# Patient Record
Sex: FEMALE | Race: WHITE | Employment: FULL TIME | URBAN - METROPOLITAN AREA
[De-identification: names, ages, dates, MRNs, and addresses within clinical notes are randomized per-mention and may not be internally consistent; named-entity substitution may affect disease eponyms.]

---

## 2017-01-03 ENCOUNTER — OFFICE VISIT (OUTPATIENT)
Dept: PAIN MANAGEMENT | Age: 42
End: 2017-01-03

## 2017-01-03 VITALS — DIASTOLIC BLOOD PRESSURE: 67 MMHG | RESPIRATION RATE: 16 BRPM | SYSTOLIC BLOOD PRESSURE: 119 MMHG | HEART RATE: 83 BPM

## 2017-01-03 DIAGNOSIS — Z79.899 ENCOUNTER FOR LONG-TERM (CURRENT) USE OF MEDICATIONS: ICD-10-CM

## 2017-01-03 DIAGNOSIS — G89.4 CHRONIC PAIN SYNDROME: ICD-10-CM

## 2017-01-03 DIAGNOSIS — Z87.898 HISTORY OF HEADACHE: ICD-10-CM

## 2017-01-03 DIAGNOSIS — M79.7 FIBROMYALGIA: Primary | ICD-10-CM

## 2017-01-03 DIAGNOSIS — M79.18 MUSCULOSKELETAL PAIN: ICD-10-CM

## 2017-01-03 LAB
ALCOHOL UR POC: NORMAL
AMPHETAMINES UR POC: NEGATIVE
BARBITURATES UR POC: NEGATIVE
BENZODIAZEPINES UR POC: NEGATIVE
BUPRENORPHINE UR POC: NORMAL
CANNABINOIDS UR POC: NEGATIVE
CARISOPRODOL UR POC: NORMAL
COCAINE UR POC: NEGATIVE
FENTANYL UR POC: NORMAL
MDMA/ECSTASY UR POC: NEGATIVE
METHADONE UR POC: NEGATIVE
METHAMPHETAMINE UR POC: NEGATIVE
METHYLPHENIDATE UR POC: NEGATIVE
OPIATES UR POC: NEGATIVE
OXYCODONE UR POC: NEGATIVE
PHENCYCLIDINE UR POC: NEGATIVE
PROPOXYPHENE UR POC: NORMAL
TRAMADOL UR POC: NORMAL
TRICYCLICS UR POC: NEGATIVE

## 2017-01-03 RX ORDER — TRAZODONE HYDROCHLORIDE 50 MG/1
50 TABLET ORAL
Qty: 30 TAB | Refills: 2 | Status: SHIPPED | OUTPATIENT
Start: 2017-01-03 | End: 2017-03-30 | Stop reason: SDUPTHER

## 2017-01-03 RX ORDER — TRAMADOL HYDROCHLORIDE 50 MG/1
50-100 TABLET ORAL
Qty: 120 TAB | Refills: 2 | Status: SHIPPED | OUTPATIENT
Start: 2017-01-03 | End: 2017-03-30 | Stop reason: SDUPTHER

## 2017-01-03 RX ORDER — GABAPENTIN 800 MG/1
800 TABLET ORAL 4 TIMES DAILY
Qty: 120 TAB | Refills: 3 | Status: SHIPPED | OUTPATIENT
Start: 2017-01-03 | End: 2017-03-30 | Stop reason: SDUPTHER

## 2017-01-03 RX ORDER — ACETAMINOPHEN AND CODEINE PHOSPHATE 300; 30 MG/1; MG/1
TABLET ORAL
Qty: 20 TAB | Refills: 0 | Status: SHIPPED | OUTPATIENT
Start: 2017-01-03

## 2017-01-03 NOTE — PROGRESS NOTES
Nursing Notes    Patient presents to the office today in follow-up. Reviewed medications with counts as follows:    Rx Date filled Qty Dispensed Pill Count Last Dose Short   Tramadol 50 mg 12/5/16 120 23 This am. 2 doses no   Ms. Jag Sevilla has a reminder for a \"due or due soon\" health maintenance. I have asked that she contact her primary care provider for follow-up on this health maintenance. POC UDS was performed in office today    Any new labs or imaging since last appointment? NO    Have you been to an emergency room (ER) or urgent care clinic since your last visit? NO            Have you been hospitalized since your last visit? NO     If yes, where, when, and reason for visit? Have you seen or consulted any other health care providers outside of the [address] [place] [address]  since your last visit? NO     If yes, where, when, and reason for visit?

## 2017-01-03 NOTE — MR AVS SNAPSHOT
Visit Information Date & Time Provider Department Dept. Phone Encounter #  
 1/3/2017  9:15 AM Radha Mcgowan MD [facility] [phone] Follow-up Instructions Return in about 3 months (around 4/3/2017). Follow-up and Disposition History Upcoming Health Maintenance Date Due DTaP/Tdap/Td series (1 - Tdap) 8/29/1995 PAP AKA CERVICAL CYTOLOGY 8/29/1995 INFLUENZA AGE 9 TO ADULT 8/1/2016 Allergies as of 1/3/2017  Review Complete On: 1/3/2017 By: Radha Mcgowan MD  
 No Known Allergies Current Immunizations  Never Reviewed No immunizations on file. Not reviewed this visit You Were Diagnosed With   
  
 Codes Comments Fibromyalgia    -  Primary ICD-10-CM: M79.7 ICD-9-CM: 729.1 Encounter for long-term (current) use of medications     ICD-10-CM: Z79.899 ICD-9-CM: V58.69 Chronic pain syndrome     ICD-10-CM: G89.4 ICD-9-CM: 338.4 Musculoskeletal pain     ICD-10-CM: M79.1 ICD-9-CM: 729.1 History of headache     ICD-10-CM: Z87.898 ICD-9-CM: V13.89 Vitals BP Pulse Resp LMP OB Status Smoking Status 119/67 83 16 12/13/2016 Having regular periods Never Smoker Vitals History Preferred Pharmacy Pharmacy Name Phone [facility] [address] [phone] Your Updated Medication List  
  
   
This list is accurate as of: 1/3/17 10:34 AM.  Always use your most recent med list.  
  
  
  
  
 BENADRYL 25 mg capsule Generic drug:  diphenhydrAMINE Take 25 mg by mouth every six (6) hours as needed. gabapentin 800 mg tablet Commonly known as:  NEURONTIN Take 1 Tab by mouth four (4) times daily for 180 days. LYSTEDA 650 mg Tab tablet Generic drug:  tranexamic acid Take  by mouth. traMADol 50 mg tablet Commonly known as:  ULTRAM  
Take 1-2 Tabs by mouth two (2) times daily as needed for Pain (chronic) for up to 180 days. * traZODone 50 mg tablet Commonly known as:  Glenys Au Take 1 Tab by mouth nightly. * traZODone 50 mg tablet Commonly known as:  Glenys Au Take 1 Tab by mouth nightly for 30 days. * TYLENOL-CODEINE #3 300-30 mg per tablet Generic drug:  acetaminophen-codeine Take 1 Tab by mouth two (2) times daily as needed. * acetaminophen-codeine 300-30 mg per tablet Commonly known as:  TYLENOL #3  
1  Every day  Prn  H/As VITAMIN D2 50,000 unit capsule Generic drug:  ergocalciferol Take 50,000 Units by mouth. * Notice: This list has 4 medication(s) that are the same as other medications prescribed for you. Read the directions carefully, and ask your doctor or other care provider to review them with you. Prescriptions Printed Refills  
 traMADol (ULTRAM) 50 mg tablet 2 Sig: Take 1-2 Tabs by mouth two (2) times daily as needed for Pain (chronic) for up to 180 days. Class: Print Route: Oral  
 acetaminophen-codeine (TYLENOL #3) 300-30 mg per tablet 0 Si  Every day  Prn  H/As Class: Print Prescriptions Sent to Pharmacy Refills  
 gabapentin (NEURONTIN) 800 mg tablet 3 Sig: Take 1 Tab by mouth four (4) times daily for 180 days. Class: Normal  
 Pharmacy: [facility] Ph #: [phone] Route: Oral  
 traZODone (DESYREL) 50 mg tablet 2 Sig: Take 1 Tab by mouth nightly for 30 days. Class: Normal  
 Pharmacy: [id number] Ph #: [phone] Route: Oral  
  
Follow-up Instructions Return in about 3 months (around 4/3/2017). Referral Information Referral ID Referred By Referred To  
  
 [id number] Lea, [address] [place] [address], PHD   
   [address] [address] [facility], [address] [place] [address] Phone: [phone] Fax: [phone] Visits Status Start Date End Date 1 New Request 1/3/17 1/3/18 If your referral has a status of pending review or denied, additional information will be sent to support the outcome of this decision. Introducing [facility] SERVICES! Awilda Waite introduces Peer5 patient portal. Now you can access parts of your medical record, email your doctor's office, and request medication refills online. 1. In your internet browser, go to [email or web address] 2. Click on the First Time User? Click Here link in the Sign In box. You will see the New Member Sign Up page. 3. Enter your Peer5 Access Code exactly as it appears below. You will not need to use this code after youve completed the sign-up process. If you do not sign up before the expiration date, you must request a new code. · Peer5 Access Code: [email or web address] Expires: 1/4/2017 10:19 AM 
 
4. Enter the last four digits of your Social Security Number (xxxx) and Date of Birth (mm/dd/yyyy) as indicated and click Submit. You will be taken to the next sign-up page. 5. Create a Peer5 ID. This will be your Peer5 login ID and cannot be changed, so think of one that is secure and easy to remember. 6. Create a Peer5 password. You can change your password at any time. 7. Enter your Password Reset Question and Answer. This can be used at a later time if you forget your password. 8. Enter your e-mail address. You will receive e-mail notification when new information is available in [address] 9. Click Sign Up. You can now view and download portions of your medical record. 10. Click the Download Summary menu link to download a portable copy of your medical information. If you have questions, please visit the Frequently Asked Questions section of the Peer5 website. Remember, Peer5 is NOT to be used for urgent needs. For medical emergencies, dial 911. Now available from your iPhone and Android! Please provide this summary of care documentation to your next provider. Your primary care clinician is listed as Tyson Barraza. If you have any questions after today's visit, please call [phone].

## 2017-01-03 NOTE — PROGRESS NOTES
HISTORY OF PRESENT ILLNESS  Slim Aaron is a 42 y.o. female. HPI Comments: Meds help with pain control and quality of life. No new side effects reported today. No new medical problems reported today. Visit survey reviewed, and will be scanned. Recent Average pain level (out of 10). --    4-5  Chief complaint, chronic pain, history of fibromyalgia  Using trazodone at night  Gabapentin 800 mg, tramadol 50 mg twice a day as needed  She does rarely use Tylenol No. 3 for rare headaches. Most recently these prescriptions have come from her primary care physician. She is wondering if we can take over those prescriptions. I have agreed. Rare use of Tylenol No. 3 for headaches. I have also requested a neurology consult with my associate Dr. Margaret Yi for any other input on her headaches. We also discussed today, however at times living with the chronic pain has a negative impact from an emotional standpoint. She would like to try cognitive behavioral therapy or other treatments available from a mental health clinic. She will be given a referral/consult for this today. Review of Systems   Constitutional: Negative. Negative for chills and fever. HENT: Negative. Negative for ear pain. Eyes: Negative. Respiratory: Negative. Negative for hemoptysis and wheezing. Cardiovascular: Negative. Gastrointestinal: Positive for constipation (controlled with diet ). Negative for nausea and vomiting. Genitourinary: Negative. Musculoskeletal: Positive for myalgias. Negative for back pain, falls, joint pain and neck pain. She denies significant changes. Skin: Negative. Negative for itching and rash. Neurological: Positive for tingling and sensory change. Psychiatric/Behavioral: Negative for depression, hallucinations, memory loss, substance abuse and suicidal ideas. The patient is nervous/anxious.          She is looking forward to participating in a program \"Changing life through integrating mind and body\". Physical Exam   Constitutional: She appears well-developed and well-nourished. She is cooperative. She does not have a sickly appearance. HENT:   Head: Normocephalic and atraumatic. Right Ear: External ear normal. No drainage. Left Ear: External ear normal. No drainage. Nose: Nose normal.   Eyes: Lids are normal. Right eye exhibits no discharge. Left eye exhibits no discharge. Right conjunctiva has no hemorrhage. Left conjunctiva has no hemorrhage. Neck: Neck supple. No tracheal deviation present. No thyroid mass present. Pulmonary/Chest: Effort normal. No respiratory distress. Neurological: She is alert. No cranial nerve deficit. Skin: Skin is intact. No rash noted. Psychiatric: Her speech is normal. Her affect is not angry. She does not express inappropriate judgment. Nursing note and vitals reviewed. ASSESSMENT and PLAN  Encounter Diagnoses   Name Primary?  Fibromyalgia Yes    Encounter for long-term (current) use of medications     Chronic pain syndrome     Musculoskeletal pain     History of headache    No signs of addiction or diversion. The primary Dxes. ,including pain are controlled. Pain/Pain control/Meds and Quality Of Life have been reviewed. Nonpharmacologic therapy and non-opioid pharmacologic therapy are always considered. If opioid therapy is prescribed, this is only if the expected benefits for both pain and function are anticipated to outweigh risks. Possible changes to treatment plan considered. Support/education given as needed. Today-medications are as listed. No significant changes to medications. Follow up -- 3 months.  --Urine test or oral swab today. Also, the prescription monitoring program was reviewed today. The majority of today's visit was spent counseling and coordinating care. Total visit time-40 minutes. -Dragon medical dictation software was used for portions of this report. Unintended errors may occur.

## 2017-02-10 ENCOUNTER — HOSPITAL ENCOUNTER (OUTPATIENT)
Dept: PHYSICAL THERAPY | Age: 42
Discharge: HOME | End: 2017-02-10
Payer: COMMERCIAL

## 2017-02-10 PROCEDURE — 97161 PT EVAL LOW COMPLEX 20 MIN: CPT

## 2017-02-10 PROCEDURE — 97112 NEUROMUSCULAR REEDUCATION: CPT

## 2017-02-10 NOTE — PROGRESS NOTES
Physical Therapy Evaluation - Vestibular  Patient is a 43 y/o female presenting with chief c/o chronic constant vertigo for 15 years. Pt had diagnostic work-ups and some other testing ordered by a neurologist 10 years ago, and the patient reported there may have been some involvement with the left inner ear. Patient has seen a chiropractor and massage therapist for fibromyalgia symptoms (pain, headaches). Pt reports unsteadiness, but denies any falls in recent years. Symptoms are worse if having flare-up of immune system (stress, GI problems ), and sometimes visual stimulus. Pt uses a shopping cart when in stores for increased security. Posture:  [x] WNL      [] Forward head    [] Protracted shoulders    [] Retracted shoulders  [] Kyphosis:  [] increased   [] decreased   [] Lordosis:   [] increased   [] decreased  Other:    C/S ROM: [x] WFL    [] Limited    Describe:    Strength: [x] WFL    [] Limited    Describe:    Optional Tests:  Sensation:  [x] Intact [] Diminished    Describe:    Proprioception: [x] Intact [] Diminished    Describe:    SEE POC for OBJECTIVE DETAILS    Oculomotor Tests: (Fixation Not Blocked)       Ocular ROM:   [x] WFL    [] Limited    Describe:       Spontaneous Nystag. [] Neg     [] Pos    [] Left    [] Right       Gaze Holding Nystag.  [] Neg     [] Pos    [] Left    [] Right        Smooth Pursuit  [x] Neg     [] Pos    [] Left    [] Right        Saccades   [] Neg     [] Pos    [] Left    [] Right        VOR - Slow Head Mvmt [] Neg     [x] Pos           VOR - Fast Head Mvmt [] Neg     [] Pos    [] Left    [] Right        Head Thrust  [] Neg     [] Pos    [] Left    [] Right        Static Visual Acuity [] Neg     [] Pos    [] Left    [] Right        Dynamic Visual Acuity [] Neg     [] Pos    [] Left    [] Right     Other Special Tests:       Vertebral Artery Testing [] Neg     [] Pos    [] Left    [] Right       Hallpike-Logan Maneuver [] Neg     [] Pos    [] Left    [] Right       Roll Test   [] Neg     [] Pos    [] Left    [] Right       Lal Balance Scale [] Neg     [] Pos    Score:       Dynamic Gait Index [] Neg     [] Pos    Score:       Functional Gait Assess. [] Neg     [] Pos    Score:    Balance Standard Testing (Eyes Open/Eyes Closed - EO/EC)       Romberg   [] WFL    [] Pos    Describe:           Stand on Foam  [] WFL    [] Pos    Describe:        Standing on Rail  [] WFL    [] Pos    Describe:        Sharpened Romberg [] WFL    [] Pos    Describe:        Single Leg Stand  [] WFL    [] Pos    Describe: Motion Sensitivity:  [] Neg     [] Pos    Describe:    Computerized Dynamic Posturography:        [] Not Tested    [] WFL    Score:     Other Tests:    Time In/Out: 1:10/1:50  Pain In/Out: 5/10, 5/10

## 2017-02-10 NOTE — PROGRESS NOTES
[facility] [address], [address] [place] [address]  Phone: [phone]   [phone]  PLAN OF CARE / [address] [place] [address] PHYSICAL THERAPY SERVICES  Patient Name: Tex Frausto : 1975   Medical   Diagnosis: Other abnormalities of gait and mobility [R26.89] Treatment Diagnosis: R26.89   Onset Date: Chronic > 15 years     Referral Source: Sherman Vivas MD [facility]): 2/10/2017   Prior Hospitalization: See medical history Provider #: [id number]   Prior Level of Function: Independent w/ ADL's   Comorbidities: Fibromyalgia   Medications: Verified on Patient Summary List   The Plan of Care and following information is based on the information from the initial evaluation.   ===========================================================================================  Assessment / key information:  Patient is a 41 y/o female presenting with chief c/o chronic constant vertigo for 15 years. Pt had diagnostic work-ups and some other testing ordered by a neurologist 10 years ago, and the patient reported there may have been some involvement with the left inner ear. Patient has seen a chiropractor and massage therapist for fibromyalgia symptoms (pain, headaches). Pt reports unsteadiness, but denies any falls in recent years. Symptoms are worse if having flare-up of immune system (stress, GI problems ), and sometimes visual stimulus. Pt uses a shopping cart when in stores for increased security. Patient presents with slightly widened-base of support with ambulation. Postural sway is normal with static standing, increased to mild sway with eyes closed and moderate sway on foam/uneven surface. Mild/mod unsteadiness with tandem walking and gait with quick direction change. Ocular AROM/smooth pursuit is [facility] but pt reports increased vertigo symptoms with faster eye movements.  Horizontal and vertical gaze stabilization testing is [facility] at slow speeds, but again this moderately increases symptoms after 30 seconds. The patient was instructed in a home exercise program to address the above findings/deficits. The patient should benefit from therapy services to progress toward functional goals and improve quality of life.  ===========================================================================================  History MEDIUM  Complexity : 1-2 comorbidities / personal factors will impact the outcome/ POC ; Examination MEDIUM Complexity : 3 Standardized tests and measures addressing body structure, function, activity limitation and / or participation in recreation ; Presentation LOW Complexity : Stable, uncomplicated ; Decision Making MEDIUM Complexity : FOTO score of 26-74; Complexity LOW   Problem List: impaired gait/ balance, decrease ADL/ functional abilitiies and decrease activity tolerance   Treatment Plan may include any combination of the following: Therapeutic activities, Neuromuscular re-education, Gait/balance training and Patient education  Patient / Family readiness to learn indicated by: asking questions, trying to perform skills and interest  Persons(s) to be included in education: patient (P)  Barriers to Learning/Limitations: None  Measures taken:    Patient Goal (s): Learn how to progress   Patient self reported health status: fair  Rehabilitation Potential: good   Short Term Goals: To be accomplished in  4  weeks:  Pt will be able to perform standing x 1 gaze stabilization exercises 2/ <5/10 dizziness, no loss of balance  Independent/compliant with long term HEP for balance and gaze stabilization  Able to balance with eyes closed on foam > 30 seconds to demonstrate improved balance/proprioception   Long Term Goals:  To be accomplished in  8  weeks:  Improve FOTO outcome score by 5-10% to indicate a significant improvement in ADL function  Advance to walking with x 1 card exercises and no loss of balance for improved dynamic balance/equilibrium  Frequency / Duration:   Patient to be seen  1  time per week for 8  weeks:  Patient / Caregiver education and instruction: activity modification and exercises  G-Codes (GP): CLINT[place]  Therapist Signature: Malathi Jaeger PT, OCS Date: 8/69/7869   Certification Period: NA Time: 1:20 PM   ===========================================================================================  I certify that the above Physical Therapy Services are being furnished while the patient is under my care. I agree with the treatment plan and certify that this therapy is necessary. Physician Signature:        Date:       Time:     Please sign and return to [facility] or you may fax the signed copy to [phone]. Thank you.

## 2017-02-17 ENCOUNTER — HOSPITAL ENCOUNTER (OUTPATIENT)
Dept: PHYSICAL THERAPY | Age: 42
Discharge: HOME | End: 2017-02-17
Payer: COMMERCIAL

## 2017-02-17 PROCEDURE — 97112 NEUROMUSCULAR REEDUCATION: CPT

## 2017-02-17 NOTE — PROGRESS NOTES
PT DAILY TREATMENT NOTE     Patient Name: Aaliyah Sam  Date:2017  : 1975  [x]  Patient  Verified  Payor: Josh Wong / Plan: Sean Maldonado RPN / Product Type: Commerical /    In time:1:03  Out time:1:27  Total Treatment Time (min): 24  Total Timed Codes (min): 24  1:1 Treatment Time (min):    Visit #: 2 of 8    Treatment Area: Other abnormalities of gait and mobility [R26.89]    SUBJECTIVE  Pain Level (0-10 scale): 5  Any medication changes, allergies to medications, adverse drug reactions, diagnosis change, or new procedure performed?: [x] No    [] Yes (see summary sheet for update)  Subjective functional status/changes:   [] No changes reported  Patient reports some mild unsteadiness later after her exercises, but she also discontinued using a medication she was taking at night for pain and she wasn't sure if that could be a factor. OBJECTIVE     24 min Neuromuscular Re-education:  []  See flow sheet :   Rationale: improve balance and increase proprioception  to improve the patients ability to stand, walk, and change body positions    Pain Level (0-10 scale) post treatment: 5    ASSESSMENT/Changes in Function: Pt repeated seated x1 card exercises today and did not need any cueing or modification to speed, head movement amplitude, etc. Pt was advised to continue the same exercise for 1 more week to acclimate. Patient will continue to benefit from skilled PT services to address imbalance/dizziness to attain remaining goals.      []  See Plan of Care  []  See progress note/recertification  []  See Discharge Summary         PLAN  []  Upgrade activities as tolerated     [x]  Continue plan of care  []  Update interventions per flow sheet       []  Discharge due to:_  []  Other:_      Malorie Pedroza, PT 2017  1:04 PM

## 2017-02-24 ENCOUNTER — HOSPITAL ENCOUNTER (OUTPATIENT)
Dept: PHYSICAL THERAPY | Age: 42
Discharge: HOME | End: 2017-02-24
Payer: COMMERCIAL

## 2017-02-24 PROCEDURE — 97112 NEUROMUSCULAR REEDUCATION: CPT

## 2017-02-24 NOTE — PROGRESS NOTES
PT DAILY TREATMENT NOTE     Patient Name: Claire Hull  Date:2017  : 1975  [x]  Patient  Verified  Payor: Orlando Mccollum / Plan: Tawanda RITCHIE / Product Type: Commerical /    In time:1:00  Out time:1:25  Total Treatment Time (min): 25  Total Timed Codes (min): 25  1:1 Treatment Time (min):    Visit #: 3 of 8    Treatment Area: Other abnormalities of gait and mobility [R26.89]    SUBJECTIVE  Pain Level (0-10 scale): 4-5  Any medication changes, allergies to medications, adverse drug reactions, diagnosis change, or new procedure performed?: [x] No    [] Yes (see summary sheet for update)  Subjective functional status/changes:   [] No changes reported  Patient reports improved response to eye exercises this week compared to the previous week, despite not being able to consistently perform 3x/day. OBJECTIVE     25 min Neuromuscular Re-education:  []  See flow sheet :   Rationale: improve balance and increase proprioception  to improve the patients ability to stand, walk, change body positions    Pain Level (0-10 scale) post treatment: 5    ASSESSMENT/Changes in Function: Pt advanced to standing x 1 card exercises today with mild and temporary symptoms. No postural/balance instability was noted, and HEP was updated accordingly. Patient will continue to benefit from skilled PT services to address imbalance/dizziness to attain remaining goals.      []  See Plan of Care  []  See progress note/recertification  []  See Discharge Summary         Progress towards goals / Updated goals:  GOAL: Pt will be able to perform standing x 1 gaze stabilization exercises 2/ <5/10 dizziness, no loss of balance- Met today    PLAN  []  Upgrade activities as tolerated     [x]  Continue plan of care  []  Update interventions per flow sheet       []  Discharge due to:_  []  Other:_      Marrion Jeans, PT 2017  1:02 PM

## 2017-03-03 ENCOUNTER — HOSPITAL ENCOUNTER (OUTPATIENT)
Dept: PHYSICAL THERAPY | Age: 42
Discharge: HOME | End: 2017-03-03
Payer: COMMERCIAL

## 2017-03-03 PROCEDURE — 97112 NEUROMUSCULAR REEDUCATION: CPT

## 2017-03-03 NOTE — PROGRESS NOTES
PT DAILY TREATMENT NOTE     Patient Name: Hemalatha Lobato  Date:3/3/2017  : 1975  [x]  Patient  Verified  Payor: Roddy Díaz / Plan: Tess Quevedo RPN / Product Type: Commerical /    In time:1:00  Out time:1:30  Total Treatment Time (min): 30  Total Timed Codes (min): 30  1:1 Treatment Time (min):    Visit #: 4 of 8    Treatment Area: Other abnormalities of gait and mobility [R26.89]    SUBJECTIVE  Pain Level (0-10 scale): 4  Any medication changes, allergies to medications, adverse drug reactions, diagnosis change, or new procedure performed?: [x] No    [] Yes (see summary sheet for update)  Subjective functional status/changes:   [] No changes reported  Pt reports her recovery period required after the eye exercises is getting shorter, so they're going better. OBJECTIVE     30 min Neuromuscular Re-education:  []  See flow sheet :   Rationale: improve balance and increase proprioception  to improve the patients ability to stand, walk, change head/body positions    Other Objective/Functional Measures:     Pain Level (0-10 scale) post treatment: 4    ASSESSMENT/Changes in Function: Pt advanced to addition of x2 card exercises (moving target). This was more challenging but pt was able to complete full 1 min intervals with mild temporary symptoms. Patient will continue to benefit from skilled PT services to address imbalance/dizziness to attain remaining goals.      []  See Plan of Care  []  See progress note/recertification  []  See Discharge Summary         Progress towards goals / Updated goals:  GOAL: Pt will be able to perform standing x 1 gaze stabilization exercises 2/ <5/10 dizziness, no loss of balance- Met    PLAN  []  Upgrade activities as tolerated     [x]  Continue plan of care  []  Update interventions per flow sheet       []  Discharge due to:_  []  Other:_      Ruth Austni, PT 3/3/2017  1:09 PM

## 2017-03-10 ENCOUNTER — HOSPITAL ENCOUNTER (OUTPATIENT)
Dept: PHYSICAL THERAPY | Age: 42
Discharge: HOME | End: 2017-03-10
Payer: COMMERCIAL

## 2017-03-10 PROCEDURE — 97112 NEUROMUSCULAR REEDUCATION: CPT

## 2017-03-10 NOTE — PROGRESS NOTES
[facility] AT [address], [address] [place] [address]  Phone: [phone]   [phone]  PROGRESS NOTE  Patient Name: Hemalatha Lobato : 1975   Treatment/Medical Diagnosis: Other abnormalities of gait and mobility [R26.89]   Referral Source: Tarik Tavarez MD     Date of Initial Visit: 2/10/17 Attended Visits: 5 Missed Visits: 0     SUMMARY OF TREATMENT  Gaze stabilization exercises, balance/proprioceptive training, HEP  CURRENT STATUS  Patient is responding well to vestibular rehab after completion of 5 of 8 prescribed visits to date, and reports 30-40% overall. She reports improved dizziness symptoms and decrease sensation of movement when looking at stationary objects. She also reports some improvements in general balance with more dynamic movements like her exercise class. Pt is progressing well with gaze stabilization exercises, and currently is able to perform standing x1 and x2 gaze stabilization exercises with minimal dizziness and no imbalance. · Short Term Goals: To be accomplished in 4 weeks:  Pt will be able to perform standing x 1 gaze stabilization exercises 2/ <5/10 dizziness, no loss of balance- Met  Independent/compliant with long term HEP for balance and gaze stabilization- Met and ongoing  Able to balance with eyes closed on foam > 30 seconds to demonstrate improved balance/proprioception- Met  · Long Term Goals: To be accomplished in 8 weeks:  Improve FOTO outcome score by 5-10% to indicate a significant improvement in ADL function- Not met, improved 2%  Advance to walking with x 1 card exercises and no loss of balance for improved dynamic balance/equilibrium- Not met    RECOMMENDATIONS  Recommend continuing therapy 1x/week for 3-5 weeks to progress toward functional long term goals. If you have any questions/comments please contact us directly at [phone].    Thank you for allowing us to assist in the care of your patient. Therapist Signature: Erlinda Reynolds PT, OCS Date: 3/10/2017     Time: 12:34 PM   NOTE TO PHYSICIAN:  PLEASE COMPLETE THE ORDERS BELOW AND FAX TO   [facility] at [facility] UNIT: [phone]. If you are unable to process this request in 24 hours please contact our office: [phone].    ___ I have read the above report and request that my patient continue as recommended.   ___ I have read the above report and request that my patient continue therapy with the following changes/special instructions:_________________________________________________________   ___ I have read the above report and request that my patient be discharged from therapy.      Physician Signature:        Date:       Time:

## 2017-03-10 NOTE — PROGRESS NOTES
PT DAILY TREATMENT NOTE     Patient Name: Tex Frausto  Date:3/10/2017  : 1975  [x]  Patient  Verified  Payor: Anabelle Nassar / Plan: Sobeida RITCHIE / Product Type: Commerical /    In time:1:04  Out time:1:32  Total Treatment Time (min): 28  Total Timed Codes (min): 28  1:1 Treatment Time (min):    Visit #: 5 of 8    Treatment Area: Other abnormalities of gait and mobility [R26.89]    SUBJECTIVE  Pain Level (0-10 scale): 4  Any medication changes, allergies to medications, adverse drug reactions, diagnosis change, or new procedure performed?: [x] No    [] Yes (see summary sheet for update)  Subjective functional status/changes:   [] No changes reported  See Progress Note    OBJECTIVE     28 min Neuromuscular Re-education:  []  See flow sheet :   Rationale: improve balance and increase proprioception  to improve the patients ability to change body positions, walk    Pain Level (0-10 scale) post treatment: 4    ASSESSMENT/Changes in Function:   Patient will continue to benefit from skilled PT services to address imbalance/dizziness to attain remaining goals.      []  See Plan of Care  [x]  See progress note/recertification  []  See Discharge Summary         PLAN  []  Upgrade activities as tolerated     [x]  Continue plan of care  []  Update interventions per flow sheet       []  Discharge due to:_  []  Other:_      Travis Medley, PT 3/10/2017  12:35 PM

## 2017-03-13 ENCOUNTER — OFFICE VISIT (OUTPATIENT)
Dept: PAIN MANAGEMENT | Age: 42
End: 2017-03-13

## 2017-03-13 VITALS
DIASTOLIC BLOOD PRESSURE: 85 MMHG | HEART RATE: 76 BPM | BODY MASS INDEX: 31.34 KG/M2 | WEIGHT: 195 LBS | HEIGHT: 66 IN | SYSTOLIC BLOOD PRESSURE: 117 MMHG

## 2017-03-13 DIAGNOSIS — M25.50 POLYARTHRALGIA: ICD-10-CM

## 2017-03-13 DIAGNOSIS — M79.10 MYALGIA: ICD-10-CM

## 2017-03-13 DIAGNOSIS — G43.001 MIGRAINE WITHOUT AURA AND WITH STATUS MIGRAINOSUS, NOT INTRACTABLE: ICD-10-CM

## 2017-03-13 DIAGNOSIS — H83.2X9 VESTIBULAR DYSFUNCTION, UNSPECIFIED LATERALITY: ICD-10-CM

## 2017-03-13 DIAGNOSIS — M79.7 FIBROMYALGIA: ICD-10-CM

## 2017-03-13 DIAGNOSIS — M79.7 FIBROMYALGIA: Primary | ICD-10-CM

## 2017-03-13 NOTE — PROGRESS NOTES
HISTORY OF PRESENT ILLNESS  Rosemarie Zelaya is a 42 y.o. female. HPI she is seen in algology assessment at the [facility]. She is referred for evaluation and treatment of widespread pain which is both musculoskeletal and polyarticular. Extensive external medical records pertaining to her prior treatment were reviewed. Her pain complaints are long-standing. She reports that she was diagnosed with fibromyalgia approximately 24 years ago by a rheumatologist who did extensive blood testing at the time. Initially, she was felt to have rheumatoid arthritis but, upon reassessment, was felt to be old due to fibromyalgia. She was never tested for Lyme disease. Approximately 14 years ago, she developed symptoms consistent with hemisensory deficits difficulty walking and profound fatigue. She was evaluated for possible multiple sclerosis including an MRI of the brain and lumbar puncture which were unremarkable. More recently she has been plagued by low level vertigo. Workup has been unremarkable although she does not recall recent MRI studies of the brain and cerebellopontine angle. She has had no recent laboratory testing to assess for underlying rheumatologic illness. She has had electrodiagnostic testing performed in the past which was unremarkable. She also has a history of episodic migraine headaches which have been well controlled with the use of tramadol and/or Tylenol 3. She reports getting at most 1-2 headaches per month. Review of Systems   Constitutional: Negative. Negative for chills and fever. HENT: Negative. Negative for ear pain. Eyes: Negative. Respiratory: Negative. Negative for hemoptysis and wheezing. Cardiovascular: Negative. Gastrointestinal: Positive for constipation (controlled with diet ). Negative for nausea and vomiting. Genitourinary: Negative. Musculoskeletal: Positive for myalgias.  Negative for back pain, falls, joint pain and neck pain.        She denies significant changes. Skin: Negative. Negative for itching and rash. Neurological: Positive for tingling and sensory change. Psychiatric/Behavioral: Negative for depression, hallucinations, memory loss, substance abuse and suicidal ideas. The patient is nervous/anxious. She is looking forward to participating in a program \"Changing life through integrating mind and body\". All other systems reviewed and are negative. Physical Exam   Constitutional: She appears well-developed and well-nourished. She is cooperative. She does not have a sickly appearance. No distress. HENT:   Head: Normocephalic and atraumatic. Right Ear: External ear normal. No drainage. Left Ear: External ear normal. No drainage. Nose: Nose normal.   Eyes: Lids are normal. Right eye exhibits no discharge. Left eye exhibits no discharge. Right conjunctiva has no hemorrhage. Left conjunctiva has no hemorrhage. Neck: Neck supple. No tracheal deviation present. No thyroid mass and no thyromegaly present. Pulmonary/Chest: Effort normal. No respiratory distress. Neurological: She is alert. No cranial nerve deficit. Skin: Skin is intact. No rash noted. Psychiatric: Her speech is normal. Her affect is not angry. She does not express inappropriate judgment. Nursing note and vitals reviewed. Her fibromyalgia score of 25 is consistent with the current ACR criteria for the diagnosis of fibromyalgia. In addition she does demonstrate 14 out of 18 tender points positive. ASSESSMENT and PLAN  Encounter Diagnoses   Name Primary?  Fibromyalgia Yes    Migraine without aura and with status migrainosus, not intractable     Polyarthralgia     Myalgia     Vestibular dysfunction, unspecified laterality      An MRI of the brain with and without contrast with particular attention to the cerebellopontine angle will be scheduled.   Laboratory testing with respect to underlying rheumatologic illness will also be undertaken. Further recommendations will be based upon the results of the above testing.     Counseling occupied > 50% of visit:  Total time: 50 minutes

## 2017-03-13 NOTE — PROGRESS NOTES
Nursing Notes    Patient presents to the office today for a consult. Patient rates her pain at 1/10 on the numerical pain scale. POC UDS was not performed in office today    Any new labs or imaging since last appointment? NO    Have you been to an emergency room (ER) or urgent care clinic since your last visit? NO            Have you been hospitalized since your last visit? NO     If yes, where, when, and reason for visit? Have you seen or consulted any other health care providers outside of the [address] [place] [address]  since your last visit? NO     If yes, where, when, and reason for visit? HM deferred to pcp.

## 2017-03-17 ENCOUNTER — HOSPITAL ENCOUNTER (OUTPATIENT)
Dept: PHYSICAL THERAPY | Age: 42
Discharge: HOME | End: 2017-03-17
Payer: COMMERCIAL

## 2017-03-17 ENCOUNTER — DOCUMENTATION ONLY (OUTPATIENT)
Dept: PAIN MANAGEMENT | Age: 42
End: 2017-03-17

## 2017-03-17 PROCEDURE — 97112 NEUROMUSCULAR REEDUCATION: CPT

## 2017-03-17 NOTE — PROGRESS NOTES
PT DAILY TREATMENT NOTE     Patient Name: Cas Gray  Date:3/17/2017  : 1975  [x]  Patient  Verified  Payor: Francisco Rather / Plan: Vani Reyes RPN / Product Type: Commerical /    In time:1:05  Out time:1:31  Total Treatment Time (min): 26  Total Timed Codes (min): 26  1:1 Treatment Time (min):    Visit #: 6 of 8-10    Treatment Area: Other abnormalities of gait and mobility [R26.89]    SUBJECTIVE  Pain Level (0-10 scale): 4-5  Any medication changes, allergies to medications, adverse drug reactions, diagnosis change, or new procedure performed?: [x] No    [] Yes (see summary sheet for update)  Subjective functional status/changes:   [] No changes reported  The eye exercise where the card is still is getting really easy for me. OBJECTIVE    26 min Neuromuscular Re-education:  []  See flow sheet :   Rationale: improve balance and increase proprioception  to improve the patients ability to stand, walk, change body positions    Pain Level (0-10 scale) post treatment: 4    ASSESSMENT/Changes in Function: Advanced to x1 card exercises walking today. Pt remained steady with her ambulation, and denied any increase in dizziness or unsteadiness during/after this activity. HEP updated accordingly. Patient will continue to benefit from skilled PT services to address imbalance/dizziness to attain remaining goals.      []  See Plan of Care  []  See progress note/recertification  []  See Discharge Summary         *    PLAN  []  Upgrade activities as tolerated     [x]  Continue plan of care  []  Update interventions per flow sheet       []  Discharge due to:_  []  Other:_      Raman Acevedo PT 3/17/2017  8:24 AM

## 2017-03-17 NOTE — PROGRESS NOTES
Paperwork for MRI faxed [facility] . They will schedule directly with pt.

## 2017-03-24 ENCOUNTER — HOSPITAL ENCOUNTER (OUTPATIENT)
Dept: PHYSICAL THERAPY | Age: 42
Discharge: HOME | End: 2017-03-24
Payer: COMMERCIAL

## 2017-03-24 PROCEDURE — 97112 NEUROMUSCULAR REEDUCATION: CPT

## 2017-03-24 NOTE — PROGRESS NOTES
PT DAILY TREATMENT NOTE     Patient Name: Leslie Fowler  Date:3/24/2017  : 1975  [x]  Patient  Verified  Payor: Mikki Cueva / Plan: Elsa Morin RPN / Product Type: Commerical /    In time:1:00  Out time:1:30  Total Treatment Time (min): 30  Total Timed Codes (min): 30  1:1 Treatment Time (min):    Visit #: 7 of 8-10    Treatment Area: Other abnormalities of gait and mobility [R26.89]    SUBJECTIVE  Pain Level (0-10 scale): 4-5  Any medication changes, allergies to medications, adverse drug reactions, diagnosis change, or new procedure performed?: [x] No    [] Yes (see summary sheet for update)  Subjective functional status/changes:   [] No changes reported  I've been under the weather this week (not related to my dizziness) but it's caused me to be in bed. OBJECTIVE     30 min Neuromuscular Re-education:  []  See flow sheet :   Rationale: improve balance and increase proprioception  to improve the patients ability to walk, change head/body positions    Pain Level (0-10 scale) post treatment: 4-5    ASSESSMENT/Changes in Function: Added directional changing drills with walking today. No unsteadiness noted with this activity. Pt needs to increase HEP compliance specifically with new walking card exercises for best improvement. Patient will continue to benefit from skilled PT services to address imbalance/dizziness to attain remaining goals.      []  See Plan of Care  []  See progress note/recertification  []  See Discharge Summary    PLAN  []  Upgrade activities as tolerated     [x]  Continue plan of care  []  Update interventions per flow sheet       []  Discharge due to:_  []  Other:_      Shelley Singleton, PT 3/24/2017  1:03 PM

## 2017-03-30 ENCOUNTER — OFFICE VISIT (OUTPATIENT)
Dept: PAIN MANAGEMENT | Age: 42
End: 2017-03-30

## 2017-03-30 VITALS — DIASTOLIC BLOOD PRESSURE: 69 MMHG | HEART RATE: 82 BPM | SYSTOLIC BLOOD PRESSURE: 113 MMHG | RESPIRATION RATE: 18 BRPM

## 2017-03-30 DIAGNOSIS — G89.4 CHRONIC PAIN SYNDROME: ICD-10-CM

## 2017-03-30 DIAGNOSIS — Z87.898 HISTORY OF HEADACHE: ICD-10-CM

## 2017-03-30 DIAGNOSIS — M79.18 MUSCULOSKELETAL PAIN: ICD-10-CM

## 2017-03-30 DIAGNOSIS — M79.7 FIBROMYALGIA: Primary | ICD-10-CM

## 2017-03-30 RX ORDER — TRAZODONE HYDROCHLORIDE 50 MG/1
50 TABLET ORAL
Qty: 30 TAB | Refills: 2 | Status: SHIPPED | OUTPATIENT
Start: 2017-03-30 | End: 2017-06-28 | Stop reason: SDUPTHER

## 2017-03-30 RX ORDER — GABAPENTIN 800 MG/1
800 TABLET ORAL 4 TIMES DAILY
Qty: 120 TAB | Refills: 3 | Status: SHIPPED | OUTPATIENT
Start: 2017-03-30 | End: 2017-06-28 | Stop reason: SDUPTHER

## 2017-03-30 RX ORDER — TRAMADOL HYDROCHLORIDE 50 MG/1
50-100 TABLET ORAL
Qty: 120 TAB | Refills: 2 | Status: SHIPPED | OUTPATIENT
Start: 2017-03-30 | End: 2017-06-28 | Stop reason: SDUPTHER

## 2017-03-30 NOTE — PROGRESS NOTES
Nursing Notes    Patient presents to the office today in follow-up. Reviewed medications with counts as follows:    Rx Date filled Qty Dispensed Pill Count Last Dose Short   Tramadol 50 mg 3/6/17 120 39 This am no   Ms. Jag Sevilla has a reminder for a \"due or due soon\" health maintenance. I have asked that she contact her primary care provider for follow-up on this health maintenance. POC UDS was not performed in office today    Any new labs or imaging since last appointment? NO    Have you been to an emergency room (ER) or urgent care clinic since your last visit? NO            Have you been hospitalized since your last visit? NO     If yes, where, when, and reason for visit? Have you seen or consulted any other health care providers outside of the [address] [place] [address]  since your last visit? NO     If yes, where, when, and reason for visit?    chiropractor

## 2017-03-30 NOTE — PROGRESS NOTES
HISTORY OF PRESENT ILLNESS  Cas Gray is a 42 y.o. female. HPI Comments: Meds help with pain control and quality of life. No new side effects reported today. Visit survey reviewed and will be scanned.  reviewed. Recent average level of pain(out of 10)-  4-5  Chief complaint pain all over  History fibromyalgia  Using tramadol 50 mg, 1 or 2, twice a day as needed  Trazodone 50 mg in the evening to help with sleep  Gabapentin 800 mg 4 times a day  Medication helps improve general activity, mood, walking, sleep, enjoyment of life      Measuring clinical outcomes of chronic pain patients. This was reviewed today. The survey will be scanned. Please see the survey for details. Total score-5      Review of Systems   Constitutional: Negative. Negative for chills and fever. HENT: Negative. Negative for ear pain. Eyes: Negative. Respiratory: Negative. Negative for hemoptysis and wheezing. Cardiovascular: Negative. Gastrointestinal: Positive for constipation (controlled with diet ). Negative for nausea and vomiting. Genitourinary: Negative. Musculoskeletal: Positive for myalgias. Negative for back pain, falls, joint pain and neck pain. She denies significant changes. Skin: Negative. Negative for itching and rash. Neurological: Positive for tingling and sensory change. Psychiatric/Behavioral: Negative for depression, hallucinations, memory loss, substance abuse and suicidal ideas. The patient is nervous/anxious. She is looking forward to participating in a program \"Changing life through integrating mind and body\". Physical Exam   Constitutional: She appears well-developed and well-nourished. She is cooperative. She does not have a sickly appearance. HENT:   Head: Normocephalic and atraumatic. Right Ear: External ear normal. No drainage. Left Ear: External ear normal. No drainage.    Nose: Nose normal.   Eyes: Lids are normal. Right eye exhibits no discharge. Left eye exhibits no discharge. Right conjunctiva has no hemorrhage. Left conjunctiva has no hemorrhage. Neck: Neck supple. No tracheal deviation present. No thyroid mass present. Pulmonary/Chest: Effort normal. No respiratory distress. Neurological: She is alert. No cranial nerve deficit. Skin: Skin is intact. No rash noted. Psychiatric: Her speech is normal. Her affect is not angry. She does not express inappropriate judgment. Nursing note and vitals reviewed. ASSESSMENT and PLAN  Encounter Diagnoses   Name Primary?  Fibromyalgia Yes    Chronic pain syndrome     Musculoskeletal pain     History of headache    No indicators for medication misuse.  reviewed. Pain Meds and Quality Of Life have been reviewed. Nonpharmacologic therapy and non-opioid pharmacologic therapy were considered. If opioid therapy is prescribed, this is only if the expected benefits are anticipated to outweigh risks. Possible changes to treatment plan considered. Support/education given as needed. Today-medications are as listed. No significant changes to medications. Follow up -- 3 months.

## 2017-03-30 NOTE — MR AVS SNAPSHOT
Visit Information Date & Time Provider Department Dept. Phone Encounter #  
 3/30/2017  9:15 AM Axel Bhatti MD [facility] [phone] [id number] Follow-up Instructions Return in about 3 months (around 6/30/2017). Follow-up and Disposition History Upcoming Health Maintenance Date Due DTaP/Tdap/Td series (1 - Tdap) 8/29/1995 PAP AKA CERVICAL CYTOLOGY 8/29/1995 INFLUENZA AGE 9 TO ADULT 8/1/2016 Allergies as of 3/30/2017  Review Complete On: 3/30/2017 By: Axel Bhatti MD  
 No Known Allergies Current Immunizations  Never Reviewed No immunizations on file. Not reviewed this visit You Were Diagnosed With   
  
 Codes Comments Fibromyalgia    -  Primary ICD-10-CM: M79.7 ICD-9-CM: 729.1 Chronic pain syndrome     ICD-10-CM: G89.4 ICD-9-CM: 338.4 Musculoskeletal pain     ICD-10-CM: M79.1 ICD-9-CM: 729.1 History of headache     ICD-10-CM: Z87.898 ICD-9-CM: V13.89 Vitals BP Pulse Resp LMP OB Status Smoking Status 113/69 82 18 03/09/2017 Having regular periods Never Smoker Preferred Pharmacy Pharmacy Name Phone [facility], [address] [place] [address] [phone] Your Updated Medication List  
  
   
This list is accurate as of: 3/30/17 10:17 AM.  Always use your most recent med list.  
  
  
  
  
 BENADRYL 25 mg capsule Generic drug:  diphenhydrAMINE Take 25 mg by mouth every six (6) hours as needed. gabapentin 800 mg tablet Commonly known as:  NEURONTIN Take 1 Tab by mouth four (4) times daily for 180 days. LYSTEDA 650 mg Tab tablet Generic drug:  tranexamic acid Take  by mouth. traMADol 50 mg tablet Commonly known as:  ULTRAM  
Take 1-2 Tabs by mouth two (2) times daily as needed for Pain (chronic) for up to 180 days. * traZODone 50 mg tablet Commonly known as:  Oletta Priscilla Take 1 Tab by mouth nightly. * traZODone 50 mg tablet Commonly known as:  Tate Shin Take 1 Tab by mouth nightly for 30 days. * TYLENOL-CODEINE #3 300-30 mg per tablet Generic drug:  acetaminophen-codeine Take 1 Tab by mouth two (2) times daily as needed. * acetaminophen-codeine 300-30 mg per tablet Commonly known as:  TYLENOL #3  
1  Every day  Prn  H/As VITAMIN D2 50,000 unit capsule Generic drug:  ergocalciferol Take 50,000 Units by mouth. * Notice: This list has 4 medication(s) that are the same as other medications prescribed for you. Read the directions carefully, and ask your doctor or other care provider to review them with you. Prescriptions Printed Refills  
 traMADol (ULTRAM) 50 mg tablet 2 Sig: Take 1-2 Tabs by mouth two (2) times daily as needed for Pain (chronic) for up to 180 days. Class: Print Route: Oral  
  
Prescriptions Sent to Pharmacy Refills  
 gabapentin (NEURONTIN) 800 mg tablet 3 Sig: Take 1 Tab by mouth four (4) times daily for 180 days. Class: Normal  
 Pharmacy: [address] Ph #: [phone] Route: Oral  
 traZODone (DESYREL) 50 mg tablet 2 Sig: Take 1 Tab by mouth nightly for 30 days. Class: Normal  
 Pharmacy: [email or web address] Ph #: [phone] Route: Oral  
  
Follow-up Instructions Return in about 3 months (around 6/30/2017). To-Do List   
 03/31/2017 1:00 PM  
  Appointment with Jannet Gordon at [address] [place] [address] ([phone]) Introducing [facility]! [employer] introduces [employer] patient portal. Now you can access parts of your medical record, email your doctor's office, and request medication refills online. 1. In your internet browser, go to [email or web address] 2. Click on the First Time User? Click Here link in the Sign In box. You will see the New Member Sign Up page. 3. Enter your [employer] Access Code exactly as it appears below. You will not need to use this code after youve completed the sign-up process. If you do not sign up before the expiration date, you must request a new code. · [employer] Access Code: [email or web address] Expires: 5/8/2017 10:42 AM 
 
4. Enter the last four digits of your Social Security Number (xxxx) and Date of Birth (mm/dd/yyyy) as indicated and click Submit. You will be taken to the next sign-up page. 5. Create a [employer] ID. This will be your [employer] login ID and cannot be changed, so think of one that is secure and easy to remember. 6. Create a [employer] password. You can change your password at any time. 7. Enter your Password Reset Question and Answer. This can be used at a later time if you forget your password. 8. Enter your e-mail address. You will receive e-mail notification when new information is available in [address]. 9. Click Sign Up. You can now view and download portions of your medical record. 10. Click the Download Summary menu link to download a portable copy of your medical information. If you have questions, please visit the Frequently Asked Questions section of the [employer] website. Remember, [employer] is NOT to be used for urgent needs. For medical emergencies, dial 911. Now available from your iPhone and Android! Please provide this summary of care documentation to your next provider. Your primary care clinician is listed as Ang Snadhu. If you have any questions after today's visit, please call [phone].

## 2017-03-31 ENCOUNTER — HOSPITAL ENCOUNTER (OUTPATIENT)
Dept: PHYSICAL THERAPY | Age: 42
Discharge: HOME | End: 2017-03-31
Payer: COMMERCIAL

## 2017-03-31 PROCEDURE — 97112 NEUROMUSCULAR REEDUCATION: CPT

## 2017-03-31 NOTE — PROGRESS NOTES
PT DAILY TREATMENT NOTE 8    Patient Name: Priscilla Seip  Date:3/31/2017  : 1975  [x]  Patient  Verified  Payor: Angelo Shetty / Plan: Jessica Mclean RPN / Product Type: Commerical /    In time:1:05  Out time:1:35  Total Treatment Time (min): 30  Total Timed Codes (min): 30  1:1 Treatment Time (min):    Visit #: 8 of 10    Treatment Area: Other abnormalities of gait and mobility [R26.89]    SUBJECTIVE  Pain Level (0-10 scale): 5- HA  Any medication changes, allergies to medications, adverse drug reactions, diagnosis change, or new procedure performed?: [x] No    [] Yes (see summary sheet for update)  Subjective functional status/changes:   [] No changes reported  I have a sinus headache today and I'm not sure how good my balance will be. OBJECTIVE    30 min Neuromuscular Re-education:  []  See flow sheet :   Rationale: improve balance and increase proprioception  to improve the patients ability to walk, change head/body positions    Pain Level (0-10 scale) post treatment: 5 HA    ASSESSMENT/Changes in Function:   GOAL:Advance to walking with x 1 card exercises and no loss of balance for improved dynamic balance/equilibrium- Met    Patient will continue to benefit from skilled PT services to address imbalance/dizziness to attain remaining goals.      []  See Plan of Care  []  See progress note/recertification  []  See Discharge Summary           PLAN  []  Upgrade activities as tolerated     [x]  Continue plan of care  []  Update interventions per flow sheet       []  Discharge due to:_  [x]  Other:_  Follow up in 2 weeks, pt will be out of town next week    Vida Collier PT 3/31/2017  3:48 PM

## 2017-04-14 ENCOUNTER — APPOINTMENT (OUTPATIENT)
Dept: PHYSICAL THERAPY | Age: 42
End: 2017-04-14
Payer: COMMERCIAL

## 2017-04-21 ENCOUNTER — HOSPITAL ENCOUNTER (OUTPATIENT)
Dept: PHYSICAL THERAPY | Age: 42
Discharge: HOME | End: 2017-04-21
Payer: COMMERCIAL

## 2017-04-21 PROCEDURE — 97112 NEUROMUSCULAR REEDUCATION: CPT

## 2017-04-21 NOTE — PROGRESS NOTES
[facility] AT [address], [address] [place] [address]  Phone: [phone]   [phone]  DISCHARGE SUMMARY  Patient Name: Sharri Dubin : 1975   Treatment/Medical Diagnosis: Other abnormalities of gait and mobility [R26.89]   Referral Source: Iván Gordon MD     Date of Initial Visit: 2/10/17 Attended Visits: 9 Missed Visits: 1     SUMMARY OF TREATMENT  Gaze stabilization exercises, balance/proprioceptive training, HEP  CURRENT STATUS  Patient has shown excellent progress after 9 therapy sessions for vestibular dysfunction/dizziness. Pt reports significant reduction in dizziness with focusing on objects, and is able to perform high level balance activities and participate in exercise class-based workouts. Pt has also progressed through all vestibular gaze stabilization exercises with walking, and agrees she is ready for discharge to continue her HEP independently. · Short Term Goals: To be accomplished in 4 weeks:  Pt will be able to perform standing x 1 gaze stabilization exercises 2/ <5/10 dizziness, no loss of balance- Met  Independent/compliant with long term HEP for balance and gaze stabilization- Met and ongoing  Able to balance with eyes closed on foam > 30 seconds to demonstrate improved balance/proprioception- Met  · Long Term Goals: To be accomplished in 8 weeks:  Improve FOTO outcome score by 5-10% to indicate a significant improvement in ADL function- Met, improved from 73/100 to 93/100  Advance to walking with x 1 card exercises and no loss of balance for improved dynamic balance/equilibrium- Met    RECOMMENDATIONS  Discontinue therapy. Progressing towards or have reached established goals. If you have any questions/comments please contact us directly at [phone]. Thank you for allowing us to assist in the care of your patient.     Therapist Signature: rPavin Wang PT, OCS Date: 17     Time: 4:23 PM

## 2017-04-21 NOTE — PROGRESS NOTES
PT DAILY TREATMENT NOTE     Patient Name: Chuck Freeze  Date:2017  : 1975  [x]  Patient  Verified  Payor: Kun Fox / Plan: Raheem Alford RPN / Product Type: Commerical /    In time:1:04  Out time:1:36  Total Treatment Time (min): 32  Total Timed Codes (min): 32  1:1 Treatment Time (min):    Visit #: 9 of 10    Treatment Area: Other abnormalities of gait and mobility [R26.89]    SUBJECTIVE  Pain Level (0-10 scale): 3  Any medication changes, allergies to medications, adverse drug reactions, diagnosis change, or new procedure performed?: [x] No    [] Yes (see summary sheet for update)  Subjective functional status/changes:   [] No changes reported  See Progress Note    OBJECTIVE     32 min Neuromuscular Re-education:  []  See flow sheet :   Rationale: improve balance and increase proprioception  to improve the patients ability to stand, walk and change body positions    Pain Level (0-10 scale) post treatment: 3    ASSESSMENT/Changes in Function:   Patient will continue to benefit from skilled PT services to address imbalance/dizziness to attain remaining goals.      []  See Plan of Care  []  See progress note/recertification  [x]  See Discharge Summary         PLAN  []  Upgrade activities as tolerated     [x]  Continue plan of care  []  Update interventions per flow sheet       []  Discharge due to:_  []  Other:_      Jennyfer Steinberg, PT 2017  1:05 PM

## 2017-06-01 RX ORDER — TRAZODONE HYDROCHLORIDE 50 MG/1
TABLET ORAL
Qty: 30 TAB | Refills: 2 | Status: SHIPPED | OUTPATIENT
Start: 2017-06-01 | End: 2017-12-12 | Stop reason: SDUPTHER

## 2017-06-28 ENCOUNTER — OFFICE VISIT (OUTPATIENT)
Dept: PAIN MANAGEMENT | Age: 42
End: 2017-06-28

## 2017-06-28 VITALS
DIASTOLIC BLOOD PRESSURE: 62 MMHG | SYSTOLIC BLOOD PRESSURE: 113 MMHG | WEIGHT: 195 LBS | HEART RATE: 76 BPM | BODY MASS INDEX: 31.34 KG/M2 | HEIGHT: 66 IN

## 2017-06-28 DIAGNOSIS — G89.4 CHRONIC PAIN SYNDROME: Primary | ICD-10-CM

## 2017-06-28 DIAGNOSIS — Z87.898 HISTORY OF HEADACHE: ICD-10-CM

## 2017-06-28 DIAGNOSIS — M79.7 FIBROMYALGIA: ICD-10-CM

## 2017-06-28 DIAGNOSIS — M79.18 MUSCULOSKELETAL PAIN: ICD-10-CM

## 2017-06-28 RX ORDER — GABAPENTIN 800 MG/1
800 TABLET ORAL 4 TIMES DAILY
Qty: 120 TAB | Refills: 3 | Status: SHIPPED | OUTPATIENT
Start: 2017-06-28 | End: 2017-09-27 | Stop reason: SDUPTHER

## 2017-06-28 RX ORDER — TRAZODONE HYDROCHLORIDE 50 MG/1
50 TABLET ORAL
Qty: 30 TAB | Refills: 2 | Status: SHIPPED | OUTPATIENT
Start: 2017-06-28 | End: 2017-07-28

## 2017-06-28 RX ORDER — TRAMADOL HYDROCHLORIDE 50 MG/1
50-100 TABLET ORAL
Qty: 120 TAB | Refills: 2 | Status: SHIPPED | OUTPATIENT
Start: 2017-06-28 | End: 2017-09-27 | Stop reason: SDUPTHER

## 2017-06-28 NOTE — MR AVS SNAPSHOT
Visit Information Date & Time Provider Department Dept. Phone Encounter #  
 6/28/2017  9:00 AM Manuel Perez MD [facility] [phone] [id number] Follow-up Instructions Return in about 2 months (around 8/28/2017). Follow-up and Disposition History Upcoming Health Maintenance Date Due DTaP/Tdap/Td series (1 - Tdap) 8/29/1995 PAP AKA CERVICAL CYTOLOGY 8/29/1995 INFLUENZA AGE 9 TO ADULT 8/1/2017 Allergies as of 6/28/2017  Review Complete On: 6/28/2017 By: Manuel Perez MD  
 No Known Allergies Current Immunizations  Never Reviewed No immunizations on file. Not reviewed this visit You Were Diagnosed With   
  
 Codes Comments Chronic pain syndrome    -  Primary ICD-10-CM: G89.4 ICD-9-CM: 338.4 Fibromyalgia     ICD-10-CM: M79.7 ICD-9-CM: 729.1 Musculoskeletal pain     ICD-10-CM: M79.1 ICD-9-CM: 729.1 History of headache     ICD-10-CM: Z87.898 ICD-9-CM: V13.89 Vitals BP Pulse Height(growth percentile) Weight(growth percentile) BMI OB Status 113/62 76 5' 6\" (1.676 m) 195 lb (88.5 kg) 31.47 kg/m2 Having regular periods Smoking Status Never Smoker Vitals History BMI and BSA Data Body Mass Index Body Surface Area  
 31.47 kg/m 2 2.03 m 2 Preferred Pharmacy Pharmacy Name Phone [facility] [address] [facility], [address] [place], [place] [phone] Your Updated Medication List  
  
   
This list is accurate as of: 6/28/17  9:36 AM.  Always use your most recent med list.  
  
  
  
  
 BENADRYL 25 mg capsule Generic drug:  diphenhydrAMINE Take 25 mg by mouth every six (6) hours as needed. gabapentin 800 mg tablet Commonly known as:  NEURONTIN Take 1 Tab by mouth four (4) times daily for 180 days. LYSTEDA 650 mg Tab tablet Generic drug:  tranexamic acid Take  by mouth. traMADol 50 mg tablet Commonly known as:  ULTRAM  
Take 1-2 Tabs by mouth two (2) times daily as needed for Pain (chronic) for up to 180 days. * traZODone 50 mg tablet Commonly known as:  DESYREL  
TAKE 1 TAB BY MOUTH NIGHTLY FOR 30 DAYS. * traZODone 50 mg tablet Commonly known as:  Irais Morale Take 1 Tab by mouth nightly for 30 days. * TYLENOL-CODEINE #3 300-30 mg per tablet Generic drug:  acetaminophen-codeine Take 1 Tab by mouth two (2) times daily as needed. * acetaminophen-codeine 300-30 mg per tablet Commonly known as:  TYLENOL #3  
1  Every day  Prn  H/As VITAMIN D2 50,000 unit capsule Generic drug:  ergocalciferol Take 50,000 Units by mouth. * Notice: This list has 4 medication(s) that are the same as other medications prescribed for you. Read the directions carefully, and ask your doctor or other care provider to review them with you. Prescriptions Printed Refills  
 traMADol (ULTRAM) 50 mg tablet 2 Sig: Take 1-2 Tabs by mouth two (2) times daily as needed for Pain (chronic) for up to 180 days. Class: Print Route: Oral  
  
Prescriptions Sent to Pharmacy Refills  
 gabapentin (NEURONTIN) 800 mg tablet 3 Sig: Take 1 Tab by mouth four (4) times daily for 180 days. Class: Normal  
 Pharmacy: [address] Ph #: [phone] Route: Oral  
 traZODone (DESYREL) 50 mg tablet 2 Sig: Take 1 Tab by mouth nightly for 30 days. Class: Normal  
 Pharmacy: [address] Ph #: [phone] Route: Oral  
  
Follow-up Instructions Return in about 2 months (around 8/28/2017). Introducing [facility] & HEALTH SERVICES! [facility] introduces [employer] patient portal. Now you can access parts of your medical record, email your doctor's office, and request medication refills online. 1. In your internet browser, go to [email or web address] 2. Click on the First Time User? Click Here link in the Sign In box. You will see the New Member Sign Up page. 3. Enter your [employer] Access Code exactly as it appears below. You will not need to use this code after youve completed the sign-up process. If you do not sign up before the expiration date, you must request a new code. · [employer] Access Code: [email or web address] Expires: 9/26/2017  9:36 AM 
 
4. Enter the last four digits of your Social Security Number (xxxx) and Date of Birth (mm/dd/yyyy) as indicated and click Submit. You will be taken to the next sign-up page. 5. Create a [employer] ID. This will be your [employer] login ID and cannot be changed, so think of one that is secure and easy to remember. 6. Create a [employer] password. You can change your password at any time. 7. Enter your Password Reset Question and Answer. This can be used at a later time if you forget your password. 8. Enter your e-mail address. You will receive e-mail notification when new information is available in [address] 9. Click Sign Up. You can now view and download portions of your medical record. 10. Click the Download Summary menu link to download a portable copy of your medical information. If you have questions, please visit the Frequently Asked Questions section of the [employer] website. Remember, [employer] is NOT to be used for urgent needs. For medical emergencies, dial 911. Now available from your iPhone and Android! Please provide this summary of care documentation to your next provider. Your primary care clinician is listed as Symone Ledbetter. If you have any questions after today's visit, please call [phone].

## 2017-06-28 NOTE — PROGRESS NOTES
HISTORY OF PRESENT ILLNESS  Munira Jefferson is a 42 y.o. female. HPI Comments: Meds help with pain control and quality of life. No new side effects reported today. Visit survey reviewed and will be scanned.  reviewed. Recent average level of pain(out of 10)-4  Chief complaint chronic pain syndrome  Pain all over, history fibromyalgia  History of headaches  She is back typically using 3 tablets of gabapentin a day. For a while she was using 4 tablets or 3-1/2 tablets. She had gone up because of a flareup. We had previously discussed this. Tramadol 50 mg, up to 4 day as needed  Trazodone 50 mg in the evening  She does also use to Yaw  She did forget her pill bottles today. Medication helps improve general activity, mood, walking, sleep, enjoyment of life      Measuring clinical outcomes of chronic pain patients. This was reviewed today. The survey will be scanned. Please see the survey for details. Total score-3      Review of Systems   Constitutional: Negative. Negative for chills and fever. HENT: Negative. Negative for ear pain. Eyes: Negative. Respiratory: Negative. Negative for hemoptysis and wheezing. Cardiovascular: Negative. Gastrointestinal: Positive for constipation (controlled with diet ). Negative for nausea and vomiting. Genitourinary: Negative. Musculoskeletal: Positive for myalgias. Negative for back pain, falls, joint pain and neck pain. She denies significant changes. Skin: Negative. Negative for itching and rash. Neurological: Positive for tingling and sensory change. Psychiatric/Behavioral: Negative for depression, hallucinations, memory loss, substance abuse and suicidal ideas. The patient is nervous/anxious. She is looking forward to participating in a program \"Changing life through integrating mind and body\". Physical Exam   Constitutional: She appears well-developed and well-nourished. She is cooperative.  She does not have a sickly appearance. HENT:   Head: Normocephalic and atraumatic. Right Ear: External ear normal. No drainage. Left Ear: External ear normal. No drainage. Nose: Nose normal.   Eyes: Lids are normal. Right eye exhibits no discharge. Left eye exhibits no discharge. Right conjunctiva has no hemorrhage. Left conjunctiva has no hemorrhage. Neck: Neck supple. No tracheal deviation present. No thyroid mass present. Pulmonary/Chest: Effort normal. No respiratory distress. Neurological: She is alert. No cranial nerve deficit. Skin: Skin is intact. No rash noted. Psychiatric: Her speech is normal. Her affect is not angry. She does not express inappropriate judgment. Nursing note and vitals reviewed. ASSESSMENT and PLAN  Encounter Diagnoses   Name Primary?  Chronic pain syndrome Yes    Fibromyalgia     Musculoskeletal pain     History of headache     No indicators for recent medication misuse.  reviewed. Pain Meds and Quality Of Life have been reviewed. Nonpharmacologic therapy and non-opioid pharmacologic therapy were considered. If opioid therapy is prescribed, this is only if the expected benefits are anticipated to outweigh risks. Possible changes to treatment plan considered. Support/education given as needed. Today-medications are as listed. No significant changes to medications. Follow up -- 3 months.    --Urine test or oral swab today. Also, the prescription monitoring program was reviewed today. The majority of today's visit was spent counseling and coordinating care. Total visit time-40 minutes. -Dragon medical dictation software was used for portions of this report. Unintended errors may occur.

## 2017-06-28 NOTE — PROGRESS NOTES
Nursing Notes    Patient presents to the office today in follow-up. Patient rates her pain at 5/10 on the numerical pain scale. Reviewed medications with counts as follows:    Rx Date filled Qty Dispensed Pill Count Last Dose Short   Tramadol 50 mg - medication not brought to appt. Pt was counseled verbally and she verbalized understanding 06/01/17 per  120                                      POC UDS was performed in office today    Any new labs or imaging since last appointment? NO    Have you been to an emergency room (ER) or urgent care clinic since your last visit? NO            Have you been hospitalized since your last visit? NO     If yes, where, when, and reason for visit? Have you seen or consulted any other health care providers outside of the [facility]  since your last visit? NO     If yes, where, when, and reason for visit? HM deferred to pcp.

## 2017-09-27 ENCOUNTER — OFFICE VISIT (OUTPATIENT)
Dept: PAIN MANAGEMENT | Age: 42
End: 2017-09-27

## 2017-09-27 VITALS
HEART RATE: 77 BPM | BODY MASS INDEX: 31.47 KG/M2 | TEMPERATURE: 95.9 F | SYSTOLIC BLOOD PRESSURE: 109 MMHG | WEIGHT: 195 LBS | RESPIRATION RATE: 20 BRPM | DIASTOLIC BLOOD PRESSURE: 65 MMHG

## 2017-09-27 DIAGNOSIS — G89.4 CHRONIC PAIN SYNDROME: Primary | ICD-10-CM

## 2017-09-27 DIAGNOSIS — M79.7 FIBROMYALGIA: ICD-10-CM

## 2017-09-27 DIAGNOSIS — M79.18 MUSCULOSKELETAL PAIN: ICD-10-CM

## 2017-09-27 DIAGNOSIS — Z79.899 ENCOUNTER FOR LONG-TERM (CURRENT) USE OF HIGH-RISK MEDICATION: ICD-10-CM

## 2017-09-27 DIAGNOSIS — Z87.898 HISTORY OF HEADACHE: ICD-10-CM

## 2017-09-27 LAB
ALCOHOL UR POC: NORMAL
AMPHETAMINES UR POC: NEGATIVE
BARBITURATES UR POC: NEGATIVE
BENZODIAZEPINES UR POC: NEGATIVE
BUPRENORPHINE UR POC: NORMAL
CANNABINOIDS UR POC: NEGATIVE
CARISOPRODOL UR POC: NORMAL
COCAINE UR POC: NEGATIVE
FENTANYL UR POC: NORMAL
MDMA/ECSTASY UR POC: NEGATIVE
METHADONE UR POC: NEGATIVE
METHAMPHETAMINE UR POC: NEGATIVE
METHYLPHENIDATE UR POC: NEGATIVE
OPIATES UR POC: NEGATIVE
OXYCODONE UR POC: NEGATIVE
PHENCYCLIDINE UR POC: NORMAL
PROPOXYPHENE UR POC: NORMAL
TRAMADOL UR POC: NORMAL
TRICYCLICS UR POC: NEGATIVE

## 2017-09-27 RX ORDER — TRAMADOL HYDROCHLORIDE 50 MG/1
50-100 TABLET ORAL
Qty: 120 TAB | Refills: 2 | Status: SHIPPED | OUTPATIENT
Start: 2017-09-27 | End: 2017-12-12 | Stop reason: SDUPTHER

## 2017-09-27 RX ORDER — GABAPENTIN 800 MG/1
800 TABLET ORAL 4 TIMES DAILY
Qty: 120 TAB | Refills: 3 | Status: SHIPPED | OUTPATIENT
Start: 2017-09-27 | End: 2017-12-12 | Stop reason: SDUPTHER

## 2017-09-27 NOTE — PROGRESS NOTES
HISTORY OF PRESENT ILLNESS  Genie Yoo is a 36 y.o. female. HPI Comments: Visit survey reviewed  Chief complaint chronic pain syndrome pain all over, history fibromyalgia  She is usually seen about every 3 months  Using gabapentin 800 mg, 4 a day  Tramadol 50 mg, up to 4 day as needed  Trazodone prescribed by different clinic  Rare use of Tylenol with codeine for headaches  I did explain to the patient that our neurologist, Dr. Andreea Mario will no longer be with our clinic when she should discuss this with her primary care physician and we can give her a formal referral to a neurology clinic in the future if she would like. She will think about this. No new significant side effects reported  Medication continues to help improve quality of life. Medications reviewed including risk and benefits. Recent average level of pain4    Measurement of clinical outcome for chronic pain patient/ SPAASMS Score Card-            Information reviewed and will be scanned. Total score today-8      Review of Systems   Constitutional: Negative. Negative for chills and fever. HENT: Negative. Negative for ear pain. Eyes: Negative. Respiratory: Negative. Negative for hemoptysis and wheezing. Cardiovascular: Negative. Gastrointestinal: Positive for constipation (controlled with diet ). Negative for nausea and vomiting. Genitourinary: Negative. Musculoskeletal: Positive for myalgias. Negative for back pain, falls, joint pain and neck pain. She denies significant changes. Skin: Negative. Negative for itching and rash. Neurological: Positive for tingling and sensory change. Psychiatric/Behavioral: Negative for depression, hallucinations, memory loss, substance abuse and suicidal ideas. The patient is nervous/anxious. She is looking forward to participating in a program \"Changing life through integrating mind and body\".         Physical Exam   Constitutional: She appears well-developed and well-nourished. She is cooperative. She does not have a sickly appearance. HENT:   Head: Normocephalic and atraumatic. Right Ear: External ear normal. No drainage. Left Ear: External ear normal. No drainage. Nose: Nose normal.   Eyes: Lids are normal. Right eye exhibits no discharge. Left eye exhibits no discharge. Right conjunctiva has no hemorrhage. Left conjunctiva has no hemorrhage. Neck: Neck supple. No tracheal deviation present. No thyroid mass present. Pulmonary/Chest: Effort normal. No respiratory distress. Neurological: She is alert. No cranial nerve deficit. Skin: Skin is intact. No rash noted. Psychiatric: Her speech is normal. Her affect is not angry. She does not express inappropriate judgment. Nursing note and vitals reviewed. ASSESSMENT and PLAN  Encounter Diagnoses   Name Primary?  Chronic pain syndrome Yes    Encounter for long-term (current) use of high-risk medication     Fibromyalgia     Musculoskeletal pain     History of headache    No indicators for recent medication misuse.  reviewed. Pain Meds and Quality Of Life have been reviewed. Nonpharmacologic therapy and non-opioid pharmacologic therapy were considered. If opioid therapy is prescribed, this is only if the expected benefits are anticipated to outweigh risks. Possible changes to treatment plan considered. Support/education given as needed. Today-medications are as listed. No significant changes to medications. Follow up -- 3 months.    --Urine test or oral swab today. Also, the prescription monitoring program was reviewed today. The majority of today's visit was spent counseling and coordinating care. Total visit time-40 minutes. -Dragon medical dictation software was used for portions of this report. Unintended errors may occur.

## 2017-09-27 NOTE — MR AVS SNAPSHOT
Visit Information Date & Time Provider Department Dept. Phone Encounter #  
 9/27/2017  9:15 AM Margie Flynn MD [address] [place] [address] Pain Management [phone] Follow-up Instructions Return in about 3 months (around 12/27/2017). Follow-up and Disposition History Your Appointments 12/12/2017  9:45 AM  
Follow Up with Margie Flynn MD  
[address] [place] [address] Pain Management [address] [place] [address]) Appt Note: return in 3 months [address] [address]  
[phone]  [phone] Upcoming Health Maintenance Date Due DTaP/Tdap/Td series (1 - Tdap) 8/29/1995 PAP AKA CERVICAL CYTOLOGY 8/29/1995 INFLUENZA AGE 9 TO ADULT 8/1/2017 Allergies as of 9/27/2017  Review Complete On: 9/27/2017 By: Margie Flynn MD  
 No Known Allergies Current Immunizations  Never Reviewed No immunizations on file. Not reviewed this visit You Were Diagnosed With   
  
 Codes Comments Chronic pain syndrome    -  Primary ICD-10-CM: G89.4 ICD-9-CM: 338.4 Encounter for long-term (current) use of high-risk medication     ICD-10-CM: Z79.899 ICD-9-CM: V58.69 Fibromyalgia     ICD-10-CM: M79.7 ICD-9-CM: 729.1 Musculoskeletal pain     ICD-10-CM: M79.1 ICD-9-CM: 729.1 History of headache     ICD-10-CM: Z87.898 ICD-9-CM: V13.89 Vitals BP Pulse Temp Resp Weight(growth percentile) LMP  
 109/65 77 95.9 °F (35.5 °C) 20 195 lb (88.5 kg) 09/15/2017 BMI OB Status Smoking Status 31.47 kg/m2 Having regular periods Never Smoker Vitals History BMI and BSA Data Body Mass Index Body Surface Area  
 31.47 kg/m 2 2.03 m 2 Preferred Pharmacy Pharmacy Name Phone [facility], [address] [place] [address] [phone] Your Updated Medication List  
  
   
 This list is accurate as of: 9/27/17 10:23 AM.  Always use your most recent med list.  
  
  
  
  
 BENADRYL 25 mg capsule Generic drug:  diphenhydrAMINE Take 25 mg by mouth every six (6) hours as needed. gabapentin 800 mg tablet Commonly known as:  NEURONTIN Take 1 Tab by mouth four (4) times daily for 180 days. LYSTEDA 650 mg Tab tablet Generic drug:  tranexamic acid Take  by mouth. traMADol 50 mg tablet Commonly known as:  ULTRAM  
Take 1-2 Tabs by mouth two (2) times daily as needed for Pain (chronic) for up to 180 days. traZODone 50 mg tablet Commonly known as:  DESYREL  
TAKE 1 TAB BY MOUTH NIGHTLY FOR 30 DAYS.  
  
 * TYLENOL-CODEINE #3 300-30 mg per tablet Generic drug:  acetaminophen-codeine Take 1 Tab by mouth two (2) times daily as needed. * acetaminophen-codeine 300-30 mg per tablet Commonly known as:  TYLENOL #3  
1  Every day  Prn  H/As VITAMIN D2 50,000 unit capsule Generic drug:  ergocalciferol Take 50,000 Units by mouth. * Notice: This list has 2 medication(s) that are the same as other medications prescribed for you. Read the directions carefully, and ask your doctor or other care provider to review them with you. Prescriptions Printed Refills  
 traMADol (ULTRAM) 50 mg tablet 2 Sig: Take 1-2 Tabs by mouth two (2) times daily as needed for Pain (chronic) for up to 180 days. Class: Print Route: Oral  
  
Prescriptions Sent to Pharmacy Refills  
 gabapentin (NEURONTIN) 800 mg tablet 3 Sig: Take 1 Tab by mouth four (4) times daily for 180 days. Class: Normal  
 Pharmacy: [place] [address] [address] [facility], [address] [place] [address] Ph #: [phone] Route: Oral  
  
We Performed the Following AMB POC DRUG SCREEN () [ [facility]] DRUG SCREEN [[id number] Custom] Follow-up Instructions Return in about 3 months (around 12/27/2017). Introducing [facility] & HEALTH SERVICES! [employer] introduces [employer] patient portal. Now you can access parts of your medical record, email your doctor's office, and request medication refills online. 1. In your internet browser, go to [email or web address] 2. Click on the First Time User? Click Here link in the Sign In box. You will see the New Member Sign Up page. 3. Enter your [employer] Access Code exactly as it appears below. You will not need to use this code after youve completed the sign-up process. If you do not sign up before the expiration date, you must request a new code. · [employer] Access Code: [email or web address] Expires: 12/26/2017 10:23 AM 
 
4. Enter the last four digits of your Social Security Number (xxxx) and Date of Birth (mm/dd/yyyy) as indicated and click Submit. You will be taken to the next sign-up page. 5. Create a [employer] ID. This will be your [employer] login ID and cannot be changed, so think of one that is secure and easy to remember. 6. Create a [employer] password. You can change your password at any time. 7. Enter your Password Reset Question and Answer. This can be used at a later time if you forget your password. 8. Enter your e-mail address. You will receive e-mail notification when new information is available in [address] 9. Click Sign Up. You can now view and download portions of your medical record. 10. Click the Download Summary menu link to download a portable copy of your medical information. If you have questions, please visit the Frequently Asked Questions section of the [employer] website. Remember, [employer] is NOT to be used for urgent needs. For medical emergencies, dial 911. Now available from your iPhone and Android! Please provide this summary of care documentation to your next provider. Your primary care clinician is listed as Rubén Carmona. If you have any questions after today's visit, please call [phone].

## 2017-09-27 NOTE — PROGRESS NOTES
Nursing Notes    Patient presents to the office today in follow-up. Patient rates her pain at 4/10 on the numerical pain scale. Reviewed medications with counts as follows:    Rx Date filled Qty Dispensed Pill Count Last Dose Short     Tramadol 50mg  09/02/17 120 29 This am  No                                             Comments:     POC UDS was performed in office today    Any new labs or imaging since last appointment? NO    Have you been to an emergency room (ER) or urgent care clinic since your last visit? NO            Have you been hospitalized since your last visit? NO     If yes, where, when, and reason for visit? Have you seen or consulted any other health care providers outside of the [address] [place] [address]  since your last visit? NO     If yes, where, when, and reason for visit? HM deferred to pcp.

## 2017-12-12 ENCOUNTER — OFFICE VISIT (OUTPATIENT)
Dept: PAIN MANAGEMENT | Age: 42
End: 2017-12-12

## 2017-12-12 VITALS
WEIGHT: 195 LBS | DIASTOLIC BLOOD PRESSURE: 69 MMHG | BODY MASS INDEX: 31.47 KG/M2 | HEART RATE: 79 BPM | TEMPERATURE: 97 F | SYSTOLIC BLOOD PRESSURE: 114 MMHG | RESPIRATION RATE: 20 BRPM

## 2017-12-12 DIAGNOSIS — Z87.898 HISTORY OF HEADACHE: ICD-10-CM

## 2017-12-12 DIAGNOSIS — G89.4 CHRONIC PAIN SYNDROME: Primary | ICD-10-CM

## 2017-12-12 DIAGNOSIS — M79.18 MUSCULOSKELETAL PAIN: ICD-10-CM

## 2017-12-12 DIAGNOSIS — M79.7 FIBROMYALGIA: ICD-10-CM

## 2017-12-12 RX ORDER — GABAPENTIN 800 MG/1
800 TABLET ORAL 4 TIMES DAILY
Qty: 120 TAB | Refills: 3 | Status: SHIPPED | OUTPATIENT
Start: 2017-12-12 | End: 2018-03-13 | Stop reason: SDUPTHER

## 2017-12-12 RX ORDER — TRAMADOL HYDROCHLORIDE 50 MG/1
50-100 TABLET ORAL
Qty: 120 TAB | Refills: 2 | Status: SHIPPED | OUTPATIENT
Start: 2017-12-12 | End: 2018-03-13 | Stop reason: SDUPTHER

## 2017-12-12 RX ORDER — TRAZODONE HYDROCHLORIDE 50 MG/1
TABLET ORAL
Qty: 30 TAB | Refills: 2 | Status: SHIPPED | OUTPATIENT
Start: 2017-12-12 | End: 2018-03-13 | Stop reason: SDUPTHER

## 2017-12-12 NOTE — PROGRESS NOTES
HISTORY OF PRESENT ILLNESS  Lola Disla is a 36 y.o. female. HPI Comments: Visit survey reviewed  Chief complaint- chronic pain syndrome- pain all over, history fibromyalgia  Medication helps general activity, mood, walking, sleep, enjoyment of life  She will continue with gabapentin 800 mg, 3-4 times a day  Tramadol 50 mg, up to 4 day as needed  Trazodone 50 mg in the evening to help with sleep  Extremely rare use of Tylenol with codeine, prescribed by different clinic  She continues to work with a chiropractor and massage therapist    No new significant side effects reported  Medication continues to help improve quality of life. Medications reviewed including risk and benefits. Recent average level of pain-5    Measurement of clinical outcome for chronic pain patient/ SPAASMS Score Card-            Information reviewed and will be scanned. Total score today-6      Review of Systems   Constitutional: Negative. Negative for chills and fever. HENT: Negative. Negative for ear pain. Eyes: Negative. Respiratory: Negative. Negative for hemoptysis and wheezing. Cardiovascular: Negative. Gastrointestinal: Positive for constipation (controlled with diet ). Negative for nausea and vomiting. Genitourinary: Negative. Musculoskeletal: Positive for myalgias. Negative for back pain, falls, joint pain and neck pain. She denies significant changes. Skin: Negative. Negative for itching and rash. Neurological: Positive for tingling and sensory change. Psychiatric/Behavioral: Negative for depression, hallucinations, memory loss, substance abuse and suicidal ideas. The patient is nervous/anxious. She is looking forward to participating in a program \"Changing life through integrating mind and body\". Physical Exam   Constitutional: She appears well-developed and well-nourished. She is cooperative. She does not have a sickly appearance.    HENT:   Head: Normocephalic and atraumatic. Right Ear: External ear normal. No drainage. Left Ear: External ear normal. No drainage. Nose: Nose normal.   Eyes: Lids are normal. Right eye exhibits no discharge. Left eye exhibits no discharge. Right conjunctiva has no hemorrhage. Left conjunctiva has no hemorrhage. Neck: Neck supple. No tracheal deviation present. No thyroid mass present. Pulmonary/Chest: Effort normal. No respiratory distress. Neurological: She is alert. No cranial nerve deficit. Skin: Skin is intact. No rash noted. Psychiatric: Her speech is normal. Her affect is not angry. She does not express inappropriate judgment. Nursing note and vitals reviewed. ASSESSMENT and PLAN  Encounter Diagnoses   Name Primary?  Chronic pain syndrome Yes    Fibromyalgia     Musculoskeletal pain     History of headache    No indicators for recent medication misuse.  reviewed. Pain Meds and Quality Of Life have been reviewed. Nonpharmacologic therapy and non-opioid pharmacologic therapy were considered. If opioid therapy is prescribed, this is only if the expected benefits are anticipated to outweigh risks. Possible changes to treatment plan considered. Support/education given as needed. Today-medications are as listed. No significant changes to medications. Follow up -- 3 months. She would like to keep, a 3 month follow-up  The patient was informed that moving forward, I will no longer be with this clinic. They were reminded that they can continue care with this clinic and I did request a follow-up for the patient with this clinic. They will also receive a list of other pain physicians/clinics in the area in case they are interested.

## 2017-12-12 NOTE — PROGRESS NOTES
Nursing Notes    Patient presents to the office today in follow-up. Patient rates her pain at 5/10 on the numerical pain scale. Reviewed medications with counts as follows:    Rx Date filled Qty Dispensed Pill Count Last Dose Short   Tramadol 50mg  12/02/17 120 84 This am  No                                           Comments:     POC UDS was not performed in office today    Any new labs or imaging since last appointment? NO    Have you been to an emergency room (ER) or urgent care clinic since your last visit? NO            Have you been hospitalized since your last visit? NO     If yes, where, when, and reason for visit? Have you seen or consulted any other health care providers outside of the [address] [place] [address]  since your last visit? NO     If yes, where, when, and reason for visit? HM deferred to pcp.

## 2017-12-12 NOTE — MR AVS SNAPSHOT
Visit Information Date & Time Provider Department Dept. Phone Encounter #  
 12/12/2017  9:45 AM Tj White MD [address] [facility]  Follow-up Instructions Return in about 3 months (around 3/12/2018). Upcoming Health Maintenance Date Due DTaP/Tdap/Td series (1 - Tdap) 8/29/1995 PAP AKA CERVICAL CYTOLOGY 8/29/1995 Influenza Age 5 to Adult 8/1/2017 Allergies as of 12/12/2017  Review Complete On: 12/12/2017 By: Tj White MD  
 No Known Allergies Current Immunizations  Never Reviewed No immunizations on file. Not reviewed this visit Vitals BP Pulse Temp Resp Weight(growth percentile) BMI  
 114/69 79 97 °F (36.1 °C) 20 195 lb (88.5 kg) 31.47 kg/m2 OB Status Smoking Status Having regular periods Never Smoker Vitals History BMI and BSA Data Body Mass Index Body Surface Area  
 31.47 kg/m 2 2.03 m 2 Preferred Pharmacy Pharmacy Name Phone [id number], [address] [place] [phone] Your Updated Medication List  
  
   
This list is accurate as of: 12/12/17 10:42 AM.  Always use your most recent med list.  
  
  
  
  
 BENADRYL 25 mg capsule Generic drug:  diphenhydrAMINE Take 25 mg by mouth every six (6) hours as needed. gabapentin 800 mg tablet Commonly known as:  NEURONTIN Take 1 Tab by mouth four (4) times daily for 180 days. LYSTEDA 650 mg Tab tablet Generic drug:  tranexamic acid Take  by mouth. traMADol 50 mg tablet Commonly known as:  ULTRAM  
Take 1-2 Tabs by mouth two (2) times daily as needed for Pain (chronic) for up to 180 days. traZODone 50 mg tablet Commonly known as:  DESYREL  
TAKE 1 TAB BY MOUTH NIGHTLY FOR 30 DAYS.  
  
 * TYLENOL-CODEINE #3 300-30 mg per tablet Generic drug:  acetaminophen-codeine Take 1 Tab by mouth two (2) times daily as needed. * acetaminophen-codeine 300-30 mg per tablet Commonly known as:  TYLENOL #3  
1  Every day  Prn  H/As VITAMIN D2 50,000 unit capsule Generic drug:  ergocalciferol Take 50,000 Units by mouth. * Notice: This list has 2 medication(s) that are the same as other medications prescribed for you. Read the directions carefully, and ask your doctor or other care provider to review them with you. Follow-up Instructions Return in about 3 months (around 3/12/2018). Introducing [facility]! Balwinder Adams introduces ebookpie patient portal. Now you can access parts of your medical record, email your doctor's office, and request medication refills online. 1. In your internet browser, go to [email or web address] 2. Click on the First Time User? Click Here link in the Sign In box. You will see the New Member Sign Up page. 3. Enter your ebookpie Access Code exactly as it appears below. You will not need to use this code after youve completed the sign-up process. If you do not sign up before the expiration date, you must request a new code. · ebookpie Access Code: [email or web address] Expires: 12/26/2017  9:23 AM 
 
4. Enter the last four digits of your Social Security Number (xxxx) and Date of Birth (mm/dd/yyyy) as indicated and click Submit. You will be taken to the next sign-up page. 5. Create a ebookpie ID. This will be your ebookpie login ID and cannot be changed, so think of one that is secure and easy to remember. 6. Create a ebookpie password. You can change your password at any time. 7. Enter your Password Reset Question and Answer. This can be used at a later time if you forget your password. 8. Enter your e-mail address. You will receive e-mail notification when new information is available in [address] 9. Click Sign Up. You can now view and download portions of your medical record.  
10. Click the Download Summary menu link to download a portable copy of your medical information. If you have questions, please visit the Frequently Asked Questions section of the "[employer] website. Remember, "[employer] is NOT to be used for urgent needs. For medical emergencies, dial 911. Now available from your iPhone and Android! Please provide this summary of care documentation to your next provider. Your primary care clinician is listed as Jadon Crum. If you have any questions after today's visit, please call [phone].

## 2018-03-13 ENCOUNTER — OFFICE VISIT (OUTPATIENT)
Dept: PAIN MANAGEMENT | Age: 43
End: 2018-03-13

## 2018-03-13 VITALS
HEIGHT: 66 IN | SYSTOLIC BLOOD PRESSURE: 113 MMHG | BODY MASS INDEX: 31.34 KG/M2 | DIASTOLIC BLOOD PRESSURE: 72 MMHG | RESPIRATION RATE: 16 BRPM | HEART RATE: 77 BPM | TEMPERATURE: 97.7 F | WEIGHT: 195 LBS

## 2018-03-13 DIAGNOSIS — Z79.899 ENCOUNTER FOR LONG-TERM (CURRENT) USE OF HIGH-RISK MEDICATION: Primary | ICD-10-CM

## 2018-03-13 DIAGNOSIS — M79.7 FIBROMYALGIA: ICD-10-CM

## 2018-03-13 DIAGNOSIS — M79.18 MUSCULOSKELETAL PAIN: ICD-10-CM

## 2018-03-13 DIAGNOSIS — G89.4 CHRONIC PAIN SYNDROME: ICD-10-CM

## 2018-03-13 LAB
ALCOHOL UR POC: NORMAL
AMPHETAMINES UR POC: NEGATIVE
BARBITURATES UR POC: NORMAL
BENZODIAZEPINES UR POC: NEGATIVE
BUPRENORPHINE UR POC: NEGATIVE
CANNABINOIDS UR POC: NEGATIVE
CARISOPRODOL UR POC: NORMAL
COCAINE UR POC: NEGATIVE
FENTANYL UR POC: NORMAL
MDMA/ECSTASY UR POC: NORMAL
METHADONE UR POC: NEGATIVE
METHAMPHETAMINE UR POC: NORMAL
METHYLPHENIDATE UR POC: NORMAL
OPIATES UR POC: NEGATIVE
OXYCODONE UR POC: NEGATIVE
PHENCYCLIDINE UR POC: NORMAL
PROPOXYPHENE UR POC: NORMAL
TRAMADOL UR POC: NORMAL
TRICYCLICS UR POC: NORMAL

## 2018-03-13 RX ORDER — GABAPENTIN 800 MG/1
800 TABLET ORAL 4 TIMES DAILY
Qty: 120 TAB | Refills: 5 | Status: SHIPPED | OUTPATIENT
Start: 2018-03-30 | End: 2018-10-25 | Stop reason: SDUPTHER

## 2018-03-13 RX ORDER — TRAMADOL HYDROCHLORIDE 50 MG/1
50-100 TABLET ORAL
Qty: 120 TAB | Refills: 2 | Status: SHIPPED | OUTPATIENT
Start: 2018-03-30 | End: 2018-06-12 | Stop reason: SDUPTHER

## 2018-03-13 RX ORDER — TRAZODONE HYDROCHLORIDE 50 MG/1
TABLET ORAL
Qty: 30 TAB | Refills: 2 | Status: SHIPPED | OUTPATIENT
Start: 2018-03-13 | End: 2018-06-12 | Stop reason: SDUPTHER

## 2018-03-13 NOTE — PROGRESS NOTES
Nursing Notes    Patient presents to the office today in follow-up. Patient rates her pain at 6/10 on the numerical pain scale. Comments: Patient is here today for a follow up appt today she states her pain level today is a 6  She states labs were taken at her [facility] office. She also states she would like to get a referral for neurology   POC UDS was performed in office today per verbal order per [facility]    Any new labs or imaging since last appointment? YES labs taken at [facility]    Have you been to an emergency room (ER) or urgent care clinic since your last visit? NO            Have you been hospitalized since your last visit? NO     If yes, where, when, and reason for visit? Have you seen or consulted any other health care providers outside of the [address] [place] [address]  since your last visit? YES PCM      If yes, where, when, and reason for visit? HM deferred to pcp.

## 2018-03-13 NOTE — PROGRESS NOTES
HISTORY OF PRESENT ILLNESS  Dorota Viera is a 36 y.o. female    HPI: Ms. Bina Horton  returns today for f/u of widespread pain which is both musculoskeletal and polyarticular. Today is my first visit with Ms. Bina Horton. She continues with pain unchanged since last visit. We discussed her current condition and medications in detail today. She has been doing well with her current treatment plan which has been offering significant pain control. She does receive Tylenol with codeine from an outside provider regarding headaches. She has been using this medication sparingly. She reports no new complaints today. She has been doing well with exercise and Pawel. We will continue with her current treatment plan with no changes. I will have her follow-up in 3 months for further evaluation and recommendation. Current medication management includes tramadol 50 mg 4 times daily as needed, gabapentin 800 mg 3-4 times daily, and trazodone 50 mg in the evening to sleep. She is prescribed Tylenol with codeine from an outside provider. Medications are helping with pain control and quality of life. Her pain is 4-7/10 with medication and 10/10 without. Pt describes pain as burning \"nerve pain. \" Aggravating factors include certain food, weather, and stress. Relieved with rest, medication, exercise, and avoiding painful activities. Current treatment is helping to improve general activity, mood, sleep, and enjoyment of life    Ms. Bina Horton is tolerating medications well, with no side effects noted. She is able to stay more active with less discomfort with these current doses. The patient reports an average of 30-60% pain relief with current treatment/medications. Pill counts are appropriate.  She is informed of side effects, risks, and benefits of this regimen, and emphasizes that she derives a significant improvement in functionality and quality of life, and notes that non-opioid medications and therapies in the past have not offered significant benefit. Pt does report constipation currently under control with diet. She  is otherwise doing well with no other complaints today. She denies any adverse events including nausea, vomiting, dizziness, increased constipation, hallucinations, or seizures. Because the patient's current regimen places him/her at increased risk for possible overdose, a prescription for naloxone nasal spray has been provided. The patient understands that this medication is only to be used in the setting of a possible overdose and that inadvertent use of this medication could precipitate overt withdrawal.    POC UDS today. Confirmation pending. No Known Allergies    History reviewed. No pertinent surgical history. Review of Systems   Constitutional: Negative for chills and fever. HENT: Negative for congestion and sore throat. Eyes: Negative for blurred vision and double vision. Respiratory: Negative for cough, shortness of breath and wheezing. Cardiovascular: Negative for chest pain and palpitations. Gastrointestinal: Positive for constipation and diarrhea. Negative for abdominal pain, heartburn, nausea and vomiting. Genitourinary: Negative. Musculoskeletal: Positive for back pain, joint pain and myalgias. Negative for neck pain. Neurological: Negative for dizziness, seizures and loss of consciousness. Endo/Heme/Allergies: Does not bruise/bleed easily. Psychiatric/Behavioral: Positive for depression. The patient has insomnia. The patient is not nervous/anxious. Physical Exam   Constitutional: She is oriented to person, place, and time and well-developed, well-nourished, and in no distress. No distress. overweight   HENT:   Head: Normocephalic and atraumatic. Eyes: EOM are normal.   Pulmonary/Chest: Effort normal.   Neurological: She is alert and oriented to person, place, and time. Skin: Skin is warm and dry. No rash noted. She is not diaphoretic. No erythema. Psychiatric: Mood, memory, affect and judgment normal.   Nursing note and vitals reviewed. ASSESSMENT:    1. Fibromyalgia    2. Chronic pain syndrome    3. Musculoskeletal pain           [place] Prescription Monitoring Program was reviewed which does not demonstrate aberrancies and/or inconsistencies with regard to the historical prescribing of controlled medications to this patient by other providers. PLAN / Pt Instructions:  1. Continue current plan with no evidence of addiction or diversion. Stable on current medication without adverse events. 2. Refill tramadol 50 mg. Please take 1-2 tablets up to 2 times daily as needed. 2 refills  3. Refill gabapentin 800 mg 4 times daily. 5 refills. 4. Refill trazodone 50 mg once nightly as needed for sleep. 2 refills per  5. Naloxone 4 mg nasal spray for opioid induced respiratory depression emergency only. 6. Discussed risks of addiction, dependency, and opioid induced hyperalgesia. 7. Return to clinic in 3 months      Medications Ordered Today   Medications    traMADol (ULTRAM) 50 mg tablet     Sig: Take 1-2 Tabs by mouth two (2) times daily as needed for Pain (chronic) for up to 180 days. Dispense:  120 Tab     Refill:  2    gabapentin (NEURONTIN) 800 mg tablet     Sig: Take 1 Tab by mouth four (4) times daily for 180 days. Dispense:  120 Tab     Refill:  5    traZODone (DESYREL) 50 mg tablet     Sig: TAKE 1 TAB BY MOUTH NIGHTLY FOR 30 DAYS. Dispense:  30 Tab     Refill:  2         DISPOSITION     Pain medications are prescribed with the objective of pain relief and improved physical and psychosocial function in this patient.  Pain Meds and Quality Of Life have been reviewed. Nonpharmacologic therapy and non-opioid pharmacologic therapy have been considered. Opioid therapy is only prescribed if the expected benefits are anticipated to outweigh risks.  Counseled patient on proper use of prescribed medications.    Reviewed with patient self-help tools, home exercise, and lifestyle changes to assist the patient in self-management of symptoms.  Reviewed with patient the treatment plan, goals of treatment plan, and limitations of treatment plan, to include the potential for side effects from medications and procedures. If side effects occur, it is the responsibility of the patient to inform the clinic so that a change in the treatment plan can be made in a safe manner. The patient is advised that stopping prescribed medication may cause an increase in symptoms and possible medication withdrawal symptoms. The patient is informed an emergency room evaluation may be necessary if this occurs. Spent 25 minutes with patient today which more than 50% of that time was spent on counseling and coordination of care. Nette Elmore 3/13/2018        Note: Please excuse any typographical errors. Voice recognition software was used for this note and may cause mistakes.

## 2018-03-13 NOTE — PATIENT INSTRUCTIONS
1. Continue current plan with no evidence of addiction or diversion. Stable on current medication without adverse events. 2. Refill tramadol 50 mg. Please take 1-2 tablets up to 2 times daily as needed. 2 refills  3. Refill gabapentin 800 mg 4 times daily. 5 refills. 4. Refill trazodone 50 mg once nightly as needed for sleep. 2 refills per  5. Naloxone 4 mg nasal spray for opioid induced respiratory depression emergency only. 6. Discussed risks of addiction, dependency, and opioid induced hyperalgesia.    7. Return to clinic in 3 months

## 2018-03-13 NOTE — MR AVS SNAPSHOT
[address] [address] 
[phone] Patient: Nawaf Uriarte MRN: [id number] :1975 Visit Information Date & Time Provider Department Dept. Phone Encounter #  
 3/13/2018  9:40 AM INDY Vazquez [facility] [id number] Follow-up Instructions Return in about 3 months (around 2018). Upcoming Health Maintenance Date Due DTaP/Tdap/Td series (1 - Tdap) 1995 PAP AKA CERVICAL CYTOLOGY 1995 Influenza Age 5 to Adult 2017 Allergies as of 3/13/2018  Review Complete On: 3/13/2018 By: INDY Vazquez No Known Allergies Current Immunizations  Never Reviewed No immunizations on file. Not reviewed this visit You Were Diagnosed With   
  
 Codes Comments Fibromyalgia     ICD-10-CM: M79.7 ICD-9-CM: 729.1 Chronic pain syndrome     ICD-10-CM: G89.4 ICD-9-CM: 338.4 Musculoskeletal pain     ICD-10-CM: M79.1 ICD-9-CM: 729.1 Vitals BP Pulse Temp Resp Height(growth percentile) Weight(growth percentile) 113/72 (BP 1 Location: Right arm, BP Patient Position: Sitting) 77 97.7 °F (36.5 °C) 16 5' 6\" (1.676 m) 195 lb (88.5 kg) BMI OB Status Smoking Status 31.47 kg/m2 Having regular periods Never Smoker BMI and BSA Data Body Mass Index Body Surface Area  
 31.47 kg/m 2 2.03 m 2 Preferred Pharmacy Pharmacy Name Phone [facility] [address] [phone] Your Updated Medication List  
  
   
This list is accurate as of 3/13/18 11:02 AM.  Always use your most recent med list.  
  
  
  
  
 BENADRYL 25 mg capsule Generic drug:  diphenhydrAMINE Take 25 mg by mouth every six (6) hours as needed. gabapentin 800 mg tablet Commonly known as:  NEURONTIN Take 1 Tab by mouth four (4) times daily for 180 days. Start taking on:  3/30/2018 LYSTEDA 650 mg Tab tablet Generic drug:  tranexamic acid Take  by mouth. traMADol 50 mg tablet Commonly known as:  ULTRAM  
Take 1-2 Tabs by mouth two (2) times daily as needed for Pain (chronic) for up to 180 days. Start taking on:  3/30/2018  
  
 traZODone 50 mg tablet Commonly known as:  DESYREL  
TAKE 1 TAB BY MOUTH NIGHTLY FOR 30 DAYS.  
  
 * TYLENOL-CODEINE #3 300-30 mg per tablet Generic drug:  acetaminophen-codeine Take 1 Tab by mouth two (2) times daily as needed. * acetaminophen-codeine 300-30 mg per tablet Commonly known as:  TYLENOL #3  
1  Every day  Prn  H/As VITAMIN D2 50,000 unit capsule Generic drug:  ergocalciferol Take 50,000 Units by mouth. * Notice: This list has 2 medication(s) that are the same as other medications prescribed for you. Read the directions carefully, and ask your doctor or other care provider to review them with you. Prescriptions Printed Refills  
 traMADol (ULTRAM) 50 mg tablet 2 Starting on: 3/30/2018 Sig: Take 1-2 Tabs by mouth two (2) times daily as needed for Pain (chronic) for up to 180 days. Class: Print Route: Oral  
  
Prescriptions Sent to Pharmacy Refills  
 gabapentin (NEURONTIN) 800 mg tablet 5 Starting on: 3/30/2018 Sig: Take 1 Tab by mouth four (4) times daily for 180 days. Class: Normal  
 Pharmacy: [facility] [id number] [facility] Ph #: [phone] Route: Oral  
 traZODone (DESYREL) 50 mg tablet 2 Sig: TAKE 1 TAB BY MOUTH NIGHTLY FOR 30 DAYS. Class: Normal  
 Pharmacy: [id number]  [facility] Ph #: [phone] Follow-up Instructions Return in about 3 months (around 6/13/2018). Patient Instructions 1. Continue current plan with no evidence of addiction or diversion. Stable on current medication without adverse events. 2. Refill tramadol 50 mg. Please take 12 tablets up to 2 times daily as needed. 2 refills 3. Refill gabapentin 800 mg 4 times daily. 5 refills. 4. Refill trazodone 50 mg once nightly as needed for sleep. 2 refills per 5. Naloxone 4 mg nasal spray for opioid induced respiratory depression emergency only. 6. Discussed risks of addiction, dependency, and opioid induced hyperalgesia. 7. Return to clinic in 3 months Introducing [facility]! [facility] introduces [employer] patient portal. Now you can access parts of your medical record, email your doctor's office, and request medication refills online. 1. In your internet browser, go to [email or web address] 2. Click on the First Time User? Click Here link in the Sign In box. You will see the New Member Sign Up page. 3. Enter your [employer] Access Code exactly as it appears below. You will not need to use this code after youve completed the sign-up process. If you do not sign up before the expiration date, you must request a new code. · [employer] Access Code: [email or web address] Expires: 6/11/2018 11:02 AM 
 
4. Enter the last four digits of your Social Security Number (xxxx) and Date of Birth (mm/dd/yyyy) as indicated and click Submit. You will be taken to the next sign-up page. 5. Create a [employer] ID. This will be your [employer] login ID and cannot be changed, so think of one that is secure and easy to remember. 6. Create a [employer] password. You can change your password at any time. 7. Enter your Password Reset Question and Answer. This can be used at a later time if you forget your password. 8. Enter your e-mail address. You will receive e-mail notification when new information is available in [address] 9. Click Sign Up. You can now view and download portions of your medical record. 10. Click the Download Summary menu link to download a portable copy of your medical information. If you have questions, please visit the Frequently Asked Questions section of the [employer] website. Remember, [employer] is NOT to be used for urgent needs. For medical emergencies, dial 911. Now available from your iPhone and Android! Please provide this summary of care documentation to your next provider. Your primary care clinician is listed as Delelenin Murguia. If you have any questions after today's visit, please call [phone].

## 2018-06-12 ENCOUNTER — OFFICE VISIT (OUTPATIENT)
Dept: PAIN MANAGEMENT | Age: 43
End: 2018-06-12

## 2018-06-12 VITALS
RESPIRATION RATE: 14 BRPM | BODY MASS INDEX: 31.34 KG/M2 | HEART RATE: 81 BPM | TEMPERATURE: 98.1 F | HEIGHT: 66 IN | WEIGHT: 195 LBS | SYSTOLIC BLOOD PRESSURE: 111 MMHG | DIASTOLIC BLOOD PRESSURE: 69 MMHG

## 2018-06-12 DIAGNOSIS — M79.2 NEUROPATHIC PAIN: ICD-10-CM

## 2018-06-12 DIAGNOSIS — Z79.899 ENCOUNTER FOR LONG-TERM (CURRENT) USE OF HIGH-RISK MEDICATION: ICD-10-CM

## 2018-06-12 DIAGNOSIS — G89.4 CHRONIC PAIN SYNDROME: Primary | ICD-10-CM

## 2018-06-12 DIAGNOSIS — M79.7 FIBROMYALGIA: ICD-10-CM

## 2018-06-12 DIAGNOSIS — M79.18 MUSCULOSKELETAL PAIN: ICD-10-CM

## 2018-06-12 RX ORDER — TRAMADOL HYDROCHLORIDE 50 MG/1
50-100 TABLET ORAL
Qty: 120 TAB | Refills: 0 | Status: SHIPPED | OUTPATIENT
Start: 2018-06-29 | End: 2018-08-29 | Stop reason: SDUPTHER

## 2018-06-12 RX ORDER — TRAZODONE HYDROCHLORIDE 50 MG/1
TABLET ORAL
Qty: 30 TAB | Refills: 2 | Status: SHIPPED | OUTPATIENT
Start: 2018-06-12 | End: 2018-08-29 | Stop reason: SDUPTHER

## 2018-06-12 RX ORDER — TRAMADOL HYDROCHLORIDE 50 MG/1
50-100 TABLET ORAL
Qty: 120 TAB | Refills: 0 | Status: SHIPPED | OUTPATIENT
Start: 2018-07-28 | End: 2018-08-29 | Stop reason: SDUPTHER

## 2018-06-12 RX ORDER — TRAMADOL HYDROCHLORIDE 50 MG/1
50-100 TABLET ORAL
Qty: 120 TAB | Refills: 0 | Status: SHIPPED | OUTPATIENT
Start: 2018-08-27 | End: 2018-08-29 | Stop reason: SDUPTHER

## 2018-06-12 RX ORDER — BISMUTH SUBSALICYLATE 262 MG
1 TABLET,CHEWABLE ORAL DAILY
COMMUNITY

## 2018-06-12 NOTE — PATIENT INSTRUCTIONS
1. Continue current plan with no evidence of addiction or diversion. Stable on current medication without adverse events. 2. Refill tramadol 50 mg. Please take 1-2 tablets up to 2 times daily as needed. 3. Continue gabapentin 800 mg 4 times daily. 5 refills. 4. Refill trazodone 50 mg once nightly as needed for sleep. 2 refills. Please discuss further treatment for sleep with your PCP as soon as possible. 5. Naloxone 4 mg nasal spray for opioid induced respiratory depression emergency only. 6. Discussed risks of addiction, dependency, and opioid induced hyperalgesia. 7. Please remember to call at least 4-5 business days prior to your medication refill. 8. Return to clinic in 3 months. Please call and cancel your appointment and pain management agreement if you do decide to transfer your care.

## 2018-06-12 NOTE — MR AVS SNAPSHOT
[address] [facility] [address] 
[phone] Patient: Elmo Dong MRN: [id number] :1975 Visit Information Date & Time Provider Department Dept. Phone Encounter #  
 2018 10:00 AM Laura Martinez [facility] [id number] Follow-up Instructions Return in about 3 months (around 2018). Upcoming Health Maintenance Date Due DTaP/Tdap/Td series (1 - Tdap) 1995 PAP AKA CERVICAL CYTOLOGY 1995 Influenza Age 5 to Adult 2018 Allergies as of 2018  Review Complete On: 2018 By: INDY Mills No Known Allergies Current Immunizations  Never Reviewed No immunizations on file. Not reviewed this visit You Were Diagnosed With   
  
 Codes Comments Chronic pain syndrome    -  Primary ICD-10-CM: G89.4 ICD-9-CM: 338.4 Fibromyalgia     ICD-10-CM: M79.7 ICD-9-CM: 729.1 Musculoskeletal pain     ICD-10-CM: M79.1 ICD-9-CM: 729.1 Encounter for long-term (current) use of high-risk medication     ICD-10-CM: Z79.899 ICD-9-CM: V58.69 Neuropathic pain     ICD-10-CM: M79.2 ICD-9-CM: 729.2 Vitals BP Pulse Temp Resp Height(growth percentile) Weight(growth percentile) 111/69 (BP 1 Location: Right arm, BP Patient Position: Sitting) 81 98.1 °F (36.7 °C) (Oral) 14 5' 6\" (1.676 m) 195 lb (88.5 kg) BMI OB Status Smoking Status 31.47 kg/m2 Having regular periods Never Smoker Vitals History BMI and BSA Data Body Mass Index Body Surface Area  
 31.47 kg/m 2 2.03 m 2 Preferred Pharmacy Pharmacy Name Phone [facility] [address] [facility], [address] [place] [address] [phone] Your Updated Medication List  
  
   
This list is accurate as of 18 10:44 AM.  Always use your most recent med list.  
  
  
  
  
 BENADRYL 25 mg capsule Generic drug:  diphenhydrAMINE Take 25 mg by mouth every six (6) hours as needed. COQ-10 PO Take  by mouth.  
  
 gabapentin 800 mg tablet Commonly known as:  NEURONTIN Take 1 Tab by mouth four (4) times daily for 180 days. LYSTEDA 650 mg Tab tablet Generic drug:  tranexamic acid Take  by mouth.  
  
 multivitamin tablet Commonly known as:  ONE A DAY Take 1 Tab by mouth daily. * traMADol 50 mg tablet Commonly known as:  ULTRAM  
Take 1-2 Tabs by mouth two (2) times daily as needed for Pain (chronic) for up to 180 days. Start taking on:  6/29/2018 * traMADol 50 mg tablet Commonly known as:  ULTRAM  
Take 1-2 Tabs by mouth two (2) times daily as needed for Pain (chronic) for up to 180 days. Start taking on:  7/28/2018 * traMADol 50 mg tablet Commonly known as:  ULTRAM  
Take 1-2 Tabs by mouth two (2) times daily as needed for Pain (chronic) for up to 180 days. Start taking on:  8/27/2018  
  
 traZODone 50 mg tablet Commonly known as:  DESYREL  
TAKE 1 TAB BY MOUTH NIGHTLY FOR 30 DAYS.  
  
 * TYLENOL-CODEINE #3 300-30 mg per tablet Generic drug:  acetaminophen-codeine Take 1 Tab by mouth two (2) times daily as needed. * acetaminophen-codeine 300-30 mg per tablet Commonly known as:  TYLENOL #3  
1  Every day  Prn  H/As VITAMIN D2 50,000 unit capsule Generic drug:  ergocalciferol Take 50,000 Units by mouth. * Notice: This list has 5 medication(s) that are the same as other medications prescribed for you. Read the directions carefully, and ask your doctor or other care provider to review them with you. Prescriptions Printed Refills  
 traMADol (ULTRAM) 50 mg tablet 0 Starting on: 6/29/2018 Sig: Take 1-2 Tabs by mouth two (2) times daily as needed for Pain (chronic) for up to 180 days. Class: Print Route: Oral  
 traMADol (ULTRAM) 50 mg tablet 0 Starting on: 7/28/2018 Sig: Take 1-2 Tabs by mouth two (2) times daily as needed for Pain (chronic) for up to 180 days. Class: Print Route: Oral  
 traMADol (ULTRAM) 50 mg tablet 0 Starting on: 8/27/2018 Sig: Take 1-2 Tabs by mouth two (2) times daily as needed for Pain (chronic) for up to 180 days. Class: Print Route: Oral  
  
Prescriptions Sent to Pharmacy Refills  
 traZODone (DESYREL) 50 mg tablet 2 Sig: TAKE 1 TAB BY MOUTH NIGHTLY FOR 30 DAYS. Class: Normal  
 Pharmacy: FARZANA RUSSELL [facility] [id number] [facility] #: [phone] Follow-up Instructions Return in about 3 months (around 9/12/2018). Patient Instructions 1. Continue current plan with no evidence of addiction or diversion. Stable on current medication without adverse events. 2. Refill tramadol 50 mg. Please take 12 tablets up to 2 times daily as needed. 3. Continue gabapentin 800 mg 4 times daily. 5 refills. 4. Refill trazodone 50 mg once nightly as needed for sleep. 2 refills. Please discuss further treatment for sleep with your PCP as soon as possible. 5. Naloxone 4 mg nasal spray for opioid induced respiratory depression emergency only. 6. Discussed risks of addiction, dependency, and opioid induced hyperalgesia. 7. Please remember to call at least 4-5 business days prior to your medication refill. 8. Return to clinic in 3 months. Please call and cancel your appointment and pain management agreement if you do decide to transfer your care. Introducing [facility]! [facility] introduces [employer] patient portal. Now you can access parts of your medical record, email your doctor's office, and request medication refills online. 1. In your internet browser, go to [email or web address] 2. Click on the First Time User? Click Here link in the Sign In box. You will see the New Member Sign Up page. 3. Enter your [employer]s Access Code exactly as it appears below. You will not need to use this code after youve completed the sign-up process. If you do not sign up before the expiration date, you must request a new code. · Blue[employer]s Access Code: [email or web address] Expires: 9/10/2018 10:41 AM 
 
4. Enter the last four digits of your Social Security Number (xxxx) and Date of Birth (mm/dd/yyyy) as indicated and click Submit. You will be taken to the next sign-up page. 5. Create a BlueRoads ID. This will be your Blue[employer]s login ID and cannot be changed, so think of one that is secure and easy to remember. 6. Create a BlueRoads password. You can change your password at any time. 7. Enter your Password Reset Question and Answer. This can be used at a later time if you forget your password. 8. Enter your e-mail address. You will receive e-mail notification when new information is available in [address]. 9. Click Sign Up. You can now view and download portions of your medical record. 10. Click the Download Summary menu link to download a portable copy of your medical information. If you have questions, please visit the Frequently Asked Questions section of the Blue[employer]s website. Remember, [employer]s is NOT to be used for urgent needs. For medical emergencies, dial 911. Now available from your iPhone and Android! Please provide this summary of care documentation to your next provider. Your primary care clinician is listed as Freddy Gerard. If you have any questions after today's visit, please call [phone].

## 2018-06-12 NOTE — PROGRESS NOTES
Nursing Notes    Patient presents to the office today in follow-up. Patient rates her pain at 4/10 on the numerical pain scale. Reviewed medications with counts as follows:    Rx Date filled Qty Dispensed Pill Count Last Dose Short   Tramadol HCL 50 mg tab 5/30/18 120 79 today no                                          Comments:     POC UDS was not performed in office today    Any new labs or imaging since last appointment? YES, MRI and Lab work. Have you been to an emergency room (ER) or urgent care clinic since your last visit? NO            Have you been hospitalized since your last visit? NO     If yes, where, when, and reason for visit? Have you seen or consulted any other health care providers outside of the [address] [place] [address]  since your last visit? YES, Neurologist and Neuro-optomologist.      If yes, where, when, and reason for visit? Ms. Annabel Machado has a reminder for a \"due or due soon\" health maintenance. I have asked that she contact her primary care provider for follow-up on this health maintenance.

## 2018-06-12 NOTE — PROGRESS NOTES
HISTORY OF PRESENT ILLNESS  Alton Abdi is a 36 y.o. female    HPI: Ms. Austen Stock  returns today for f/u of widespread pain associated with fibromyalgia. She also complains of symptoms consistent with peripheral neuropathy. She receives Tylenol with codeine from her neurologist for migraines. Ms. Austen Stock continues to do well with her current treatment plan which has been offering moderate pain control. She reports no significant changes since her last visit. We discussed changes to her practice. Explained to her that moving forward we will be focusing on more conservative and non-opioid plan of care. This will result in changes to her current treatment plan. We will continue with her current treatment plan for today but will begin tapering her medications next visit. She has received trazodone from our practice for sleep. I explained to her that we can no longer treat for sleep within our practice. I have asked her to discuss further sleep treatment with her PCP as soon as possible. She verbally agrees. I will have her follow-up in 3 months for further evaluation and recommendation. Current medication management includes tramadol 50 mg 1-2 tablets 2 times daily as needed, gabapentin 800 mg 3-4 times daily, and trazodone 50 mg in the evening to sleep. She is prescribed Tylenol with codeine from an outside provider. Medications are helping with pain control and quality of life. Her pain is 4-7/10 with medication and 10/10 without. Pt describes pain as burning \"nerve pain. \" Aggravating factors include certain food, weather, and stress. Relieved with rest, medication, exercise, and avoiding painful activities. Current treatment is helping to improve general activity, mood, sleep, and enjoyment of life    Ms. Austen Stock is tolerating medications well, with no side effects noted. She is able to stay more active with less discomfort with these current doses.  The patient reports an average of 30-60% pain relief with current treatment/medications. She is informed of side effects, risks, and benefits of this regimen, and emphasizes that she derives a significant improvement in functionality and quality of life, and notes that non-opioid medications and therapies in the past have not offered significant benefit. Pt does report constipation currently under control with diet. She  is otherwise doing well with no other complaints today. She denies any adverse events including nausea, vomiting, dizziness, increased constipation, hallucinations, or seizures. Because the patient's current regimen places him/her at increased risk for possible overdose, a prescription for naloxone nasal spray has been provided. The patient understands that this medication is only to be used in the setting of a possible overdose and that inadvertent use of this medication could precipitate overt withdrawal.    MME: 20  COMM: 4  OSWESTRY: 8 %  Last UDS reviewed       No Known Allergies    History reviewed. No pertinent surgical history. Review of Systems   Constitutional: Negative for chills and fever. HENT: Negative for congestion and sore throat. Eyes: Negative for blurred vision and double vision. Respiratory: Negative for cough, shortness of breath and wheezing. Cardiovascular: Negative for chest pain and palpitations. Gastrointestinal: Positive for constipation and diarrhea. Negative for abdominal pain, heartburn, nausea and vomiting. Genitourinary: Negative. Musculoskeletal: Positive for back pain, joint pain and myalgias. Negative for neck pain. Neurological: Negative for dizziness, seizures and loss of consciousness. Endo/Heme/Allergies: Does not bruise/bleed easily. Psychiatric/Behavioral: Positive for depression. The patient has insomnia. The patient is not nervous/anxious. Physical Exam   Constitutional: She is oriented to person, place, and time and well-developed, well-nourished, and in no distress. No distress. overweight   HENT:   Head: Normocephalic and atraumatic. Eyes: EOM are normal.   Pulmonary/Chest: Effort normal.   Neurological: She is alert and oriented to person, place, and time. Skin: Skin is warm and dry. No rash noted. She is not diaphoretic. No erythema. Psychiatric: Mood, memory, affect and judgment normal.   Nursing note and vitals reviewed. ASSESSMENT:    1. Chronic pain syndrome    2. Fibromyalgia    3. Musculoskeletal pain    4. Encounter for long-term (current) use of high-risk medication    5. Neuropathic pain           [place] Prescription Monitoring Program was reviewed which does not demonstrate aberrancies and/or inconsistencies with regard to the historical prescribing of controlled medications to this patient by other providers. PLAN / Pt Instructions:  1. Continue current plan with no evidence of addiction or diversion. Stable on current medication without adverse events. 2. Refill tramadol 50 mg. Please take 1-2 tablets up to 2 times daily as needed. 3. Continue gabapentin 800 mg 4 times daily. 5 refills. 4. Refill trazodone 50 mg once nightly as needed for sleep. 2 refills. Please discuss further treatment for sleep with your PCP as soon as possible. 5. Naloxone 4 mg nasal spray for opioid induced respiratory depression emergency only. 6. Discussed risks of addiction, dependency, and opioid induced hyperalgesia. 7. Please remember to call at least 4-5 business days prior to your medication refill. 8. Return to clinic in 3 months. Please call and cancel your appointment and pain management agreement if you do decide to transfer your care. Medications Ordered Today   Medications    traMADol (ULTRAM) 50 mg tablet     Sig: Take 1-2 Tabs by mouth two (2) times daily as needed for Pain (chronic) for up to 180 days.      Dispense:  120 Tab     Refill:  0    traMADol (ULTRAM) 50 mg tablet     Sig: Take 1-2 Tabs by mouth two (2) times daily as needed for Pain (chronic) for up to 180 days. Dispense:  120 Tab     Refill:  0    traMADol (ULTRAM) 50 mg tablet     Sig: Take 1-2 Tabs by mouth two (2) times daily as needed for Pain (chronic) for up to 180 days. Dispense:  120 Tab     Refill:  0    traZODone (DESYREL) 50 mg tablet     Sig: TAKE 1 TAB BY MOUTH NIGHTLY FOR 30 DAYS. Dispense:  30 Tab     Refill:  2       DISPOSITION     Pain medications are prescribed with the objective of pain relief and improved physical and psychosocial function in this patient.  Pain Meds and Quality Of Life have been reviewed. Nonpharmacologic therapy and non-opioid pharmacologic therapy have been considered. Opioid therapy is only prescribed if the expected benefits are anticipated to outweigh risks.  Counseled patient on proper use of prescribed medications.  Reviewed with patient self-help tools, home exercise, and lifestyle changes to assist the patient in self-management of symptoms.  Reviewed with patient the treatment plan, goals of treatment plan, and limitations of treatment plan, to include the potential for side effects from medications and procedures. If side effects occur, it is the responsibility of the patient to inform the clinic so that a change in the treatment plan can be made in a safe manner. The patient is advised that stopping prescribed medication may cause an increase in symptoms and possible medication withdrawal symptoms. The patient is informed an emergency room evaluation may be necessary if this occurs. Spent 25 minutes with patient today which more than 50% of that time was spent on counseling and coordination of care. Nette Villaseñor 6/12/2018        Note: Please excuse any typographical errors. Voice recognition software was used for this note and may cause mistakes.

## 2018-07-30 ENCOUNTER — TELEPHONE (OUTPATIENT)
Dept: PAIN MANAGEMENT | Age: 43
End: 2018-07-30

## 2018-07-30 ENCOUNTER — DOCUMENTATION ONLY (OUTPATIENT)
Dept: PAIN MANAGEMENT | Age: 43
End: 2018-07-30

## 2018-07-30 NOTE — TELEPHONE ENCOUNTER
Nelly Hartley has called requesting a refill of their controlled medication, tramadol 50mg , for the management of chronic generalized pain. Last office visit date: 06/12/18    Date last  was pulled and reviewed : 07/30/18    Was the patient compliant when the above report was pulled? yes    Analgesia: 40% pain relief noted     Aberrancies: none noted     ADL's: patient is able to complete adl care    Adverse Reaction: none noted     Provider's last note and plan of care reviewed? yes  Request forwarded to provider for review.

## 2018-07-30 NOTE — TELEPHONE ENCOUNTER
07/2018 prescription pre-printed by provider; medication called in to patient's pharmacy of choice; provider made aware.

## 2018-08-29 ENCOUNTER — OFFICE VISIT (OUTPATIENT)
Dept: PAIN MANAGEMENT | Age: 43
End: 2018-08-29

## 2018-08-29 VITALS
RESPIRATION RATE: 16 BRPM | HEIGHT: 66 IN | DIASTOLIC BLOOD PRESSURE: 64 MMHG | TEMPERATURE: 97.6 F | HEART RATE: 83 BPM | WEIGHT: 215 LBS | SYSTOLIC BLOOD PRESSURE: 114 MMHG | BODY MASS INDEX: 34.55 KG/M2

## 2018-08-29 DIAGNOSIS — M79.2 NEUROPATHIC PAIN: ICD-10-CM

## 2018-08-29 DIAGNOSIS — M79.7 FIBROMYALGIA: ICD-10-CM

## 2018-08-29 DIAGNOSIS — Z79.899 ENCOUNTER FOR LONG-TERM (CURRENT) USE OF HIGH-RISK MEDICATION: Primary | ICD-10-CM

## 2018-08-29 DIAGNOSIS — M79.18 MUSCULOSKELETAL PAIN: ICD-10-CM

## 2018-08-29 DIAGNOSIS — G89.4 CHRONIC PAIN SYNDROME: ICD-10-CM

## 2018-08-29 LAB
ALCOHOL UR POC: NORMAL
AMPHETAMINES UR POC: NEGATIVE
BARBITURATES UR POC: NORMAL
BENZODIAZEPINES UR POC: NORMAL
BUPRENORPHINE UR POC: NEGATIVE
CANNABINOIDS UR POC: NEGATIVE
CARISOPRODOL UR POC: NORMAL
COCAINE UR POC: NEGATIVE
FENTANYL UR POC: NORMAL
MDMA/ECSTASY UR POC: NORMAL
METHADONE UR POC: NEGATIVE
METHAMPHETAMINE UR POC: NORMAL
METHYLPHENIDATE UR POC: NORMAL
OPIATES UR POC: NORMAL
OXYCODONE UR POC: NEGATIVE
PHENCYCLIDINE UR POC: NORMAL
PROPOXYPHENE UR POC: NORMAL
TRAMADOL UR POC: NORMAL
TRICYCLICS UR POC: NORMAL

## 2018-08-29 RX ORDER — TRAZODONE HYDROCHLORIDE 50 MG/1
TABLET ORAL
Qty: 30 TAB | Refills: 2 | Status: SHIPPED | OUTPATIENT
Start: 2018-08-29

## 2018-08-29 RX ORDER — TRAMADOL HYDROCHLORIDE 50 MG/1
50-100 TABLET ORAL
Qty: 110 TAB | Refills: 0 | Status: SHIPPED | OUTPATIENT
Start: 2018-08-29 | End: 2018-09-28

## 2018-08-29 RX ORDER — DIPHENHYDRAMINE HCL 25 MG
25 CAPSULE ORAL
COMMUNITY

## 2018-08-29 RX ORDER — TRAMADOL HYDROCHLORIDE 50 MG/1
50 TABLET ORAL
Qty: 90 TAB | Refills: 0 | Status: SHIPPED | OUTPATIENT
Start: 2018-10-27 | End: 2018-11-26

## 2018-08-29 RX ORDER — TRAMADOL HYDROCHLORIDE 50 MG/1
50-100 TABLET ORAL
Qty: 100 TAB | Refills: 0 | Status: SHIPPED | OUTPATIENT
Start: 2018-09-28 | End: 2018-10-28

## 2018-08-29 NOTE — PATIENT INSTRUCTIONS
1. Modify current plan with no evidence of addiction or diversion. Stable on current medication without adverse events. 2. Refill tramadol 50 mg. Please take 1-2 tablets up to 2 times daily as needed. 3. Continue gabapentin 800 mg 4 times daily. 5 refills. 4. Refill trazodone 50 mg once nightly as needed for sleep. 2 refills. Please discuss further treatment for sleep with your PCP as soon as possible. This will be the last prescription from our office. 5. Naloxone 4 mg nasal spray for opioid induced respiratory depression emergency only. 6. Discussed risks of addiction, dependency, and opioid induced hyperalgesia. 7. Please remember to call at least 5 business days prior to your medication refill. 8. Return to clinic in 3 months. Please call and cancel your appointment and pain management agreement if you do decide to transfer your care.

## 2018-08-29 NOTE — PROGRESS NOTES
HISTORY OF PRESENT ILLNESS  Ashleigh Shah is a 39 y.o. female    HPI: Ms. Bradley Lugo  returns today for f/u of widespread pain associated with fibromyalgia. She also complains of symptoms consistent with peripheral neuropathy. She receives Tylenol with codeine from her neurologist for migraines. Ms. Bradley Lugo continues to do well with her current treatment plan which has been offering moderate pain control. She reports no new complaints since her last visit. She has been following up with her neurologist who has also agreed that tapering off her trazodone would be a good idea. She says that she has not talked to her family care doctor regarding alternative treatments for sleep yet but is planning to do so as soon as possible. I have agreed to give her 1 more prescription for trazodone but she understands that she must have this taken over before next visit as this will be her last prescription for trazodone with our practice. We will begin tapering her tramadol as previously discussed. Please see tapering plan below. She is quite worried about discontinuing her tramadol altogether. I have asked her to schedule appointment 3 months for further evaluation and recommendation or sooner if needed. She was reminded to call and cancel her appointment and pain management agreement she does decide to transfer care. Current medication management includes tramadol 50 mg 1-2 tablets 2 times daily as needed, gabapentin 800 mg 3-4 times daily, and trazodone 50 mg in the evening to sleep. She is prescribed Tylenol with codeine from an outside provider. Medications are helping with pain control and quality of life. Her pain is 4-7/10 with medication and 10/10 without. Pt describes pain as burning \"nerve pain. \" Aggravating factors include certain food, weather, and stress. Relieved with rest, medication, exercise, and avoiding painful activities.  Current treatment is helping to improve general activity, mood, sleep, and enjoyment of life    Ms. Radha Spangler is tolerating medications well, with no side effects noted. She is able to stay more active with less discomfort with these current doses. The patient reports an average of 30-60% pain relief with current treatment/medications. She is informed of side effects, risks, and benefits of this regimen, and emphasizes that she derives a significant improvement in functionality and quality of life, and notes that non-opioid medications and therapies in the past have not offered significant benefit. Pt does report constipation currently under control with diet. She  is otherwise doing well with no other complaints today. She denies any adverse events including nausea, vomiting, dizziness, increased constipation, hallucinations, or seizures. Because the patient's current regimen places him/her at increased risk for possible overdose, a prescription for naloxone nasal spray has been provided. The patient understands that this medication is only to be used in the setting of a possible overdose and that inadvertent use of this medication could precipitate overt withdrawal.    MME: 20  COMM: 6  OSWESTRY: 10 %  POC UDS today. Confirmation pending. No Known Allergies    History reviewed. No pertinent surgical history. Review of Systems   Constitutional: Negative for chills and fever. HENT: Negative for congestion and sore throat. Eyes: Negative for blurred vision and double vision. Respiratory: Negative for cough, shortness of breath and wheezing. Cardiovascular: Negative for chest pain and palpitations. Gastrointestinal: Positive for constipation and diarrhea. Negative for abdominal pain, heartburn, nausea and vomiting. Genitourinary: Negative. Musculoskeletal: Positive for back pain, joint pain and myalgias. Negative for neck pain. Neurological: Negative for dizziness, seizures and loss of consciousness. Endo/Heme/Allergies: Does not bruise/bleed easily. Psychiatric/Behavioral: Positive for depression. The patient has insomnia. The patient is not nervous/anxious. Physical Exam   Constitutional: She is oriented to person, place, and time and well-developed, well-nourished, and in no distress. No distress. overweight   HENT:   Head: Normocephalic and atraumatic. Eyes: EOM are normal.   Pulmonary/Chest: Effort normal.   Neurological: She is alert and oriented to person, place, and time. Skin: Skin is warm and dry. No rash noted. She is not diaphoretic. No erythema. Psychiatric: Mood, memory, affect and judgment normal.   Nursing note and vitals reviewed. ASSESSMENT:    1. Encounter for long-term (current) use of high-risk medication    2. Fibromyalgia    3. Chronic pain syndrome    4. Musculoskeletal pain    5. Neuropathic pain           [place] Prescription Monitoring Program was reviewed which does not demonstrate aberrancies and/or inconsistencies with regard to the historical prescribing of controlled medications to this patient by other providers. PLAN / Pt Instructions:  1. Modify current plan with no evidence of addiction or diversion. Stable on current medication without adverse events. 2. Refill tramadol 50 mg. Please take 1-2 tablets up to 2 times daily as needed. 3. Continue gabapentin 800 mg 4 times daily. 5 refills. 4. Refill trazodone 50 mg once nightly as needed for sleep. 2 refills. Please discuss further treatment for sleep with your PCP as soon as possible. This will be the last prescription from our office. 5. Naloxone 4 mg nasal spray for opioid induced respiratory depression emergency only. 6. Discussed risks of addiction, dependency, and opioid induced hyperalgesia. 7. Please remember to call at least 5 business days prior to your medication refill. 8. Return to clinic in 3 months.  Please call and cancel your appointment and pain management agreement if you do decide to transfer your care.      Medications Ordered Today   Medications    traMADol (ULTRAM) 50 mg tablet     Sig: Take 1-2 Tabs by mouth two (2) times daily as needed for Pain (chronic) for up to 30 days. No more than #110 per month     Dispense:  110 Tab     Refill:  0    traMADol (ULTRAM) 50 mg tablet     Sig: Take 1-2 Tabs by mouth two (2) times daily as needed for Pain (chronic) for up to 30 days. No more than #100 per month     Dispense:  100 Tab     Refill:  0    traMADol (ULTRAM) 50 mg tablet     Sig: Take 1 Tab by mouth three (3) times daily as needed for Pain (chronic) for up to 30 days. Max Daily Amount: 150 mg. Dispense:  90 Tab     Refill:  0    traZODone (DESYREL) 50 mg tablet     Sig: TAKE 1 TAB BY MOUTH NIGHTLY FOR 30 DAYS. Dispense:  30 Tab     Refill:  2       DISPOSITION     Pain medications are prescribed with the objective of pain relief and improved physical and psychosocial function in this patient.  Pain Meds and Quality Of Life have been reviewed. Nonpharmacologic therapy and non-opioid pharmacologic therapy have been considered. Opioid therapy is only prescribed if the expected benefits are anticipated to outweigh risks.  Counseled patient on proper use of prescribed medications.  Reviewed with patient self-help tools, home exercise, and lifestyle changes to assist the patient in self-management of symptoms.  Reviewed with patient the treatment plan, goals of treatment plan, and limitations of treatment plan, to include the potential for side effects from medications and procedures. If side effects occur, it is the responsibility of the patient to inform the clinic so that a change in the treatment plan can be made in a safe manner. The patient is advised that stopping prescribed medication may cause an increase in symptoms and possible medication withdrawal symptoms. The patient is informed an emergency room evaluation may be necessary if this occurs.       Spent 25 minutes with patient today which more than 50% of that time was spent on counseling and coordination of care. Nette Chew 8/29/2018        Note: Please excuse any typographical errors. Voice recognition software was used for this note and may cause mistakes.

## 2018-08-29 NOTE — PROGRESS NOTES
Nursing Notes    Patient presents to the office today in follow-up. Patient rates her pain at 5/10 on the numerical pain scale. Reviewed medications with counts as follows:    Rx Date filled Qty Dispensed Pill Count Last Dose Short   Tramadol 50 mg 7/30/18 120 2 today no                                      PHQ over the last two weeks 8/29/2018   Little interest or pleasure in doing things Not at all   Feeling down, depressed, irritable, or hopeless Not at all   Total Score PHQ 2 0         Comments:     POC UDS was performed in office today    Any new labs or imaging since last appointment? YES, lab     Have you been to an emergency room (ER) or urgent care clinic since your last visit? NO            Have you been hospitalized since your last visit? NO     If yes, where, when, and reason for visit? Have you seen or consulted any other health care providers outside of the [address] [place] [address]  since your last visit? YES     If yes, where, when, and reason for visit? Ms. Elizabeth Leonardo has a reminder for a \"due or due soon\" health maintenance. I have asked that she contact her primary care provider for follow-up on this health maintenance.

## 2018-08-29 NOTE — MR AVS SNAPSHOT
[address] [address] 
[phone] Patient: Smiley Price MRN: [id number] :1975 Visit Information Date & Time Provider Department Dept. Phone Encounter #  
 2018  7:30 AM Alicja Saini [facility] [phone] Follow-up Instructions Return in about 3 months (around 2018). Upcoming Health Maintenance Date Due DTaP/Tdap/Td series (1 - Tdap) 1995 PAP AKA CERVICAL CYTOLOGY 1995 Influenza Age 5 to Adult 2018 Allergies as of 2018  Review Complete On: 2018 By: INDY Bolton No Known Allergies Current Immunizations  Never Reviewed No immunizations on file. Not reviewed this visit You Were Diagnosed With   
  
 Codes Comments Encounter for long-term (current) use of high-risk medication    -  Primary ICD-10-CM: K55.433 ICD-9-CM: V58.69 Fibromyalgia     ICD-10-CM: M79.7 ICD-9-CM: 729.1 Chronic pain syndrome     ICD-10-CM: G89.4 ICD-9-CM: 338.4 Musculoskeletal pain     ICD-10-CM: M79.1 ICD-9-CM: 729.1 Neuropathic pain     ICD-10-CM: M79.2 ICD-9-CM: 729.2 Vitals BP Pulse Temp Resp Height(growth percentile) Weight(growth percentile) 114/64 (BP 1 Location: Right arm, BP Patient Position: Sitting) 83 97.6 °F (36.4 °C) (Oral) 16 5' 6\" (1.676 m) 215 lb (97.5 kg) LMP BMI OB Status Smoking Status 2018 34.7 kg/m2 Having regular periods Never Smoker Vitals History BMI and BSA Data Body Mass Index Body Surface Area 34.7 kg/m 2 2.13 m 2 Preferred Pharmacy Pharmacy Name Phone [facility] [address] [facility], [address] [place] [address] [phone] Your Updated Medication List  
  
   
This list is accurate as of 18  8:33 AM.  Always use your most recent med list.  
  
  
  
  
 acetaminophen-codeine 300-30 mg per tablet Commonly known as:  TYLENOL #3  
1  Every day  Prn  H/As BENADRYL 25 mg capsule Generic drug:  diphenhydrAMINE Take 25 mg by mouth nightly. COQ-10 PO Take  by mouth.  
  
 gabapentin 800 mg tablet Commonly known as:  NEURONTIN Take 1 Tab by mouth four (4) times daily for 180 days. LYSTEDA 650 mg Tab tablet Generic drug:  tranexamic acid Take  by mouth.  
  
 multivitamin tablet Commonly known as:  ONE A DAY Take 1 Tab by mouth daily. CORY-E (ENTERIC COATED) 200 mg Tbec Generic drug:  S-Adenosylmethionine Take  by mouth. * traMADol 50 mg tablet Commonly known as:  ULTRAM  
Take 1-2 Tabs by mouth two (2) times daily as needed for Pain (chronic) for up to 30 days. No more than #110 per month * traMADol 50 mg tablet Commonly known as:  ULTRAM  
Take 1-2 Tabs by mouth two (2) times daily as needed for Pain (chronic) for up to 30 days. No more than #100 per month Start taking on:  9/28/2018 * traMADol 50 mg tablet Commonly known as:  ULTRAM  
Take 1 Tab by mouth three (3) times daily as needed for Pain (chronic) for up to 30 days. Max Daily Amount: 150 mg. Start taking on:  10/27/2018  
  
 traZODone 50 mg tablet Commonly known as:  DESYREL  
TAKE 1 TAB BY MOUTH NIGHTLY FOR 30 DAYS. VITAMIN D2 50,000 unit capsule Generic drug:  ergocalciferol Take 50,000 Units by mouth. * Notice: This list has 3 medication(s) that are the same as other medications prescribed for you. Read the directions carefully, and ask your doctor or other care provider to review them with you. Prescriptions Printed Refills  
 traMADol (ULTRAM) 50 mg tablet 0 Sig: Take 1-2 Tabs by mouth two (2) times daily as needed for Pain (chronic) for up to 30 days. No more than #110 per month Class: Print Route: Oral  
 traMADol (ULTRAM) 50 mg tablet 0 Starting on: 9/28/2018 Sig: Take 1-2 Tabs by mouth two (2) times daily as needed for Pain (chronic) for up to 30 days. No more than #100 per month Class: Print Route: Oral  
 traMADol (ULTRAM) 50 mg tablet 0 Starting on: 10/27/2018 Sig: Take 1 Tab by mouth three (3) times daily as needed for Pain (chronic) for up to 30 days. Max Daily Amount: 150 mg.  
 Class: Print Route: Oral  
  
Prescriptions Sent to Pharmacy Refills  
 traZODone (DESYREL) 50 mg tablet 2 Sig: TAKE 1 TAB BY MOUTH NIGHTLY FOR 30 DAYS. Class: Normal  
 Pharmacy: [facility] [id number] [facility] Ph #: [phone] We Performed the Following AMB POC DRUG SCREEN () [ [facility]] DRUG SCREEN [[id number] Custom] Follow-up Instructions Return in about 3 months (around 11/29/2018). Patient Instructions 1. Modify current plan with no evidence of addiction or diversion. Stable on current medication without adverse events. 2. Refill tramadol 50 mg. Please take 12 tablets up to 2 times daily as needed. 3. Continue gabapentin 800 mg 4 times daily. 5 refills. 4. Refill trazodone 50 mg once nightly as needed for sleep. 2 refills. Please discuss further treatment for sleep with your PCP as soon as possible. This will be the last prescription from our office. 5. Naloxone 4 mg nasal spray for opioid induced respiratory depression emergency only. 6. Discussed risks of addiction, dependency, and opioid induced hyperalgesia. 7. Please remember to call at least 5 business days prior to your medication refill. 8. Return to clinic in 3 months. Please call and cancel your appointment and pain management agreement if you do decide to transfer your care. Introducing [facility]! [employer] introduces Online [employer] patient portal. Now you can access parts of your medical record, email your doctor's office, and request medication refills online.    
 
1. In your internet browser, go to [email or web address] 2. Click on the First Time User? Click Here link in the Sign In box. You will see the New Member Sign Up page. 3. Enter your [employer] Access Code exactly as it appears below. You will not need to use this code after youve completed the sign-up process. If you do not sign up before the expiration date, you must request a new code. · [employer] Access Code: [email or web address] Expires: 9/10/2018 10:41 AM 
 
4. Enter the last four digits of your Social Security Number (xxxx) and Date of Birth (mm/dd/yyyy) as indicated and click Submit. You will be taken to the next sign-up page. 5. Create a [email or web address] ID. This will be your [employer] login ID and cannot be changed, so think of one that is secure and easy to remember. 6. Create a [employer] password. You can change your password at any time. 7. Enter your Password Reset Question and Answer. This can be used at a later time if you forget your password. 8. Enter your e-mail address. You will receive e-mail notification when new information is available in [address] 9. Click Sign Up. You can now view and download portions of your medical record. 10. Click the Download Summary menu link to download a portable copy of your medical information. If you have questions, please visit the Frequently Asked Questions section of the [employer] website. Remember, [employer] is NOT to be used for urgent needs. For medical emergencies, dial 911. Now available from your iPhone and Android! Please provide this summary of care documentation to your next provider. Your primary care clinician is listed as Gabriele Golden. If you have any questions after today's visit, please call [phone].

## 2018-09-21 ENCOUNTER — TELEPHONE (OUTPATIENT)
Dept: PAIN MANAGEMENT | Age: 43
End: 2018-09-21

## 2018-09-21 NOTE — TELEPHONE ENCOUNTER
Tex Frausto has called requesting a refill of their controlled medication Tramadol  for the management of chronic pain syndrome     Last office visit date: 08/29/18    Date last  was pulled and reviewed : 08/29/18    Was the patient compliant when the above report was pulled? yes    Analgesia: The patient states that she has a pain relief from the pain medication     Aberrancies: There are no aberrancies noted at this time     ADL's: The patient states that she is able to perform her adls while on the medications     Adverse Reaction: There are no adverse reactions noted at this time. Provider's last note and plan of care reviewed? yes  Request forwarded to provider for review.     Script is preprinted I will send this call to [facility]

## 2018-09-21 NOTE — TELEPHONE ENCOUNTER
Medication refill called in [phone]  10:23am    1. Name, verified  2. Medicine requested - tramadol  3.  [phone]  4. Analgesia - 30-40% pain relief  5. ADL - yes  6.   Adverse reaction to medicine - no

## 2018-10-18 ENCOUNTER — TELEPHONE (OUTPATIENT)
Dept: PAIN MANAGEMENT | Age: 43
End: 2018-10-18

## 2018-10-18 NOTE — TELEPHONE ENCOUNTER
Pt called in for a prescription refill on 10/18/18 current medication runs out on 10/24/18. Please give pt a call at [phone] when the prescription is ready for .       Medication:Tramadol  Relief:30%  Daily Task:Yes  Side Effects:No

## 2018-10-18 NOTE — TELEPHONE ENCOUNTER
Nicolle Tavarez has called requesting a refill of their controlled medication, tramadol, for the management of generalized body pain. Last office visit date: 8/19/18 with Iris Belcher, next 11/26/18     Date last  was pulled and reviewed : 10/18/18 last filled 9/26/18    Was the patient compliant when the above report was pulled? yes    Analgesia: 30%    Aberrancies: none    ADL's: yes    Adverse Reaction: no    Provider's last note and plan of care reviewed? yes  Request forwarded to provider for review. Contract 12/12/17   UDS 4/17/18                                             prescriptions pre-printed by provider.

## 2018-10-19 NOTE — TELEPHONE ENCOUNTER
Attempted to contact patient regarding prescription pick-up ; no answer noted at number given; vm left to contact triage line. Need to instruct pt to discuss sleep difficulties with her PCP.

## 2018-10-19 NOTE — TELEPHONE ENCOUNTER
Please remind patient to discuss sleep difficulties with her primary care provider. Reviewed. OK to distribute prescription.

## 2018-10-23 ENCOUNTER — TELEPHONE (OUTPATIENT)
Dept: PAIN MANAGEMENT | Age: 43
End: 2018-10-23

## 2018-10-23 NOTE — TELEPHONE ENCOUNTER
Medicine refill called in [phone]  3:15pm     1. Name, verified  2. Medicine - gabapentin   3.  [phone]  4. Analgesia - 30%  5. ADL - yes  6.   Adverse reaction to medicine - none